# Patient Record
Sex: MALE | Race: WHITE | Employment: OTHER
[De-identification: names, ages, dates, MRNs, and addresses within clinical notes are randomized per-mention and may not be internally consistent; named-entity substitution may affect disease eponyms.]

---

## 2017-02-16 ENCOUNTER — OFFICE VISIT (OUTPATIENT)
Dept: FAMILY MEDICINE CLINIC | Facility: CLINIC | Age: 77
End: 2017-02-16

## 2017-02-16 VITALS
DIASTOLIC BLOOD PRESSURE: 70 MMHG | WEIGHT: 149 LBS | BODY MASS INDEX: 21.38 KG/M2 | OXYGEN SATURATION: 97 % | SYSTOLIC BLOOD PRESSURE: 133 MMHG | TEMPERATURE: 97.1 F | HEART RATE: 79 BPM

## 2017-02-16 DIAGNOSIS — E11.9 TYPE 2 DIABETES MELLITUS WITHOUT COMPLICATION, WITHOUT LONG-TERM CURRENT USE OF INSULIN (HCC): Primary | ICD-10-CM

## 2017-02-16 DIAGNOSIS — I10 ESSENTIAL HYPERTENSION: ICD-10-CM

## 2017-02-16 DIAGNOSIS — D64.9 ANEMIA, UNSPECIFIED TYPE: ICD-10-CM

## 2017-02-16 DIAGNOSIS — Z23 NEED FOR SHINGLES VACCINE: ICD-10-CM

## 2017-02-16 PROCEDURE — 1123F ACP DISCUSS/DSCN MKR DOCD: CPT | Performed by: FAMILY MEDICINE

## 2017-02-16 PROCEDURE — 99213 OFFICE O/P EST LOW 20 MIN: CPT | Performed by: FAMILY MEDICINE

## 2017-02-16 PROCEDURE — 1036F TOBACCO NON-USER: CPT | Performed by: FAMILY MEDICINE

## 2017-02-16 PROCEDURE — G8427 DOCREV CUR MEDS BY ELIG CLIN: HCPCS | Performed by: FAMILY MEDICINE

## 2017-02-16 PROCEDURE — G8484 FLU IMMUNIZE NO ADMIN: HCPCS | Performed by: FAMILY MEDICINE

## 2017-02-16 PROCEDURE — 4040F PNEUMOC VAC/ADMIN/RCVD: CPT | Performed by: FAMILY MEDICINE

## 2017-02-16 PROCEDURE — G8419 CALC BMI OUT NRM PARAM NOF/U: HCPCS | Performed by: FAMILY MEDICINE

## 2017-02-16 RX ORDER — SIMVASTATIN 40 MG
40 TABLET ORAL NIGHTLY
Qty: 30 TABLET | Refills: 3 | Status: SHIPPED | OUTPATIENT
Start: 2017-02-16 | End: 2017-04-14 | Stop reason: SDUPTHER

## 2017-02-19 ASSESSMENT — ENCOUNTER SYMPTOMS
VOMITING: 0
CONSTIPATION: 0
RHINORRHEA: 0
COUGH: 0
SHORTNESS OF BREATH: 0
ABDOMINAL PAIN: 0
WHEEZING: 0
NAUSEA: 0
BLOOD IN STOOL: 0
SORE THROAT: 0
TROUBLE SWALLOWING: 0
DIARRHEA: 0

## 2017-04-14 ENCOUNTER — OFFICE VISIT (OUTPATIENT)
Dept: FAMILY MEDICINE CLINIC | Age: 77
End: 2017-04-14
Payer: MEDICARE

## 2017-04-14 VITALS
SYSTOLIC BLOOD PRESSURE: 111 MMHG | BODY MASS INDEX: 20.62 KG/M2 | HEIGHT: 70 IN | HEART RATE: 82 BPM | DIASTOLIC BLOOD PRESSURE: 54 MMHG | WEIGHT: 144 LBS | TEMPERATURE: 97.5 F

## 2017-04-14 DIAGNOSIS — M79.645 PAIN OF FINGER OF LEFT HAND: Primary | ICD-10-CM

## 2017-04-14 DIAGNOSIS — E78.00 HYPERCHOLESTEREMIA: ICD-10-CM

## 2017-04-14 DIAGNOSIS — R19.7 DIARRHEA, UNSPECIFIED TYPE: ICD-10-CM

## 2017-04-14 DIAGNOSIS — Z23 NEED FOR PNEUMOCOCCAL VACCINATION: ICD-10-CM

## 2017-04-14 DIAGNOSIS — I10 ESSENTIAL HYPERTENSION: ICD-10-CM

## 2017-04-14 DIAGNOSIS — E11.9 TYPE 2 DIABETES MELLITUS WITHOUT COMPLICATION, WITHOUT LONG-TERM CURRENT USE OF INSULIN (HCC): ICD-10-CM

## 2017-04-14 PROCEDURE — 1036F TOBACCO NON-USER: CPT | Performed by: FAMILY MEDICINE

## 2017-04-14 PROCEDURE — 1123F ACP DISCUSS/DSCN MKR DOCD: CPT | Performed by: FAMILY MEDICINE

## 2017-04-14 PROCEDURE — 4040F PNEUMOC VAC/ADMIN/RCVD: CPT | Performed by: FAMILY MEDICINE

## 2017-04-14 PROCEDURE — G0009 ADMIN PNEUMOCOCCAL VACCINE: HCPCS | Performed by: FAMILY MEDICINE

## 2017-04-14 PROCEDURE — 90732 PPSV23 VACC 2 YRS+ SUBQ/IM: CPT | Performed by: FAMILY MEDICINE

## 2017-04-14 PROCEDURE — 99214 OFFICE O/P EST MOD 30 MIN: CPT | Performed by: FAMILY MEDICINE

## 2017-04-14 PROCEDURE — G8419 CALC BMI OUT NRM PARAM NOF/U: HCPCS | Performed by: FAMILY MEDICINE

## 2017-04-14 PROCEDURE — G8427 DOCREV CUR MEDS BY ELIG CLIN: HCPCS | Performed by: FAMILY MEDICINE

## 2017-04-14 RX ORDER — GLIMEPIRIDE 2 MG/1
2 TABLET ORAL 2 TIMES DAILY
Qty: 30 TABLET | Refills: 3 | Status: ON HOLD | OUTPATIENT
Start: 2017-04-14 | End: 2020-11-30

## 2017-04-14 RX ORDER — SIMVASTATIN 40 MG
40 TABLET ORAL NIGHTLY
Qty: 30 TABLET | Refills: 3 | Status: SHIPPED | OUTPATIENT
Start: 2017-04-14

## 2017-04-14 RX ORDER — LISINOPRIL AND HYDROCHLOROTHIAZIDE 20; 12.5 MG/1; MG/1
1 TABLET ORAL DAILY
Qty: 30 TABLET | Refills: 3 | Status: ON HOLD | OUTPATIENT
Start: 2017-04-14 | End: 2020-11-10 | Stop reason: HOSPADM

## 2017-04-14 ASSESSMENT — ENCOUNTER SYMPTOMS
BLOOD IN STOOL: 0
WHEEZING: 0
SHORTNESS OF BREATH: 0
VOMITING: 0
CONSTIPATION: 0
ABDOMINAL PAIN: 0
DIARRHEA: 1
SORE THROAT: 0
COUGH: 0
TROUBLE SWALLOWING: 0

## 2017-04-18 ENCOUNTER — HOSPITAL ENCOUNTER (OUTPATIENT)
Dept: GENERAL RADIOLOGY | Facility: CLINIC | Age: 77
Discharge: HOME | End: 2017-04-18
Payer: MEDICARE

## 2017-04-18 ENCOUNTER — HOSPITAL ENCOUNTER (OUTPATIENT)
Facility: CLINIC | Age: 77
Discharge: HOME | End: 2017-04-18
Payer: MEDICARE

## 2017-04-18 DIAGNOSIS — M79.645 PAIN OF FINGER OF LEFT HAND: ICD-10-CM

## 2017-04-18 PROCEDURE — 73130 X-RAY EXAM OF HAND: CPT

## 2020-11-09 ENCOUNTER — HOSPITAL ENCOUNTER (INPATIENT)
Age: 80
LOS: 1 days | Discharge: HOME | DRG: 683 | End: 2020-11-10
Attending: EMERGENCY MEDICINE | Admitting: INTERNAL MEDICINE
Payer: MEDICARE

## 2020-11-09 ENCOUNTER — APPOINTMENT (OUTPATIENT)
Dept: CT IMAGING | Age: 80
DRG: 683 | End: 2020-11-09
Payer: MEDICARE

## 2020-11-09 DIAGNOSIS — N17.9 AKI (ACUTE KIDNEY INJURY) (HCC): ICD-10-CM

## 2020-11-09 DIAGNOSIS — R10.33 PERIUMBILICAL ABDOMINAL PAIN: Primary | ICD-10-CM

## 2020-11-09 PROBLEM — Z95.5 S/P CORONARY ARTERY STENT PLACEMENT: Status: ACTIVE | Noted: 2020-11-09

## 2020-11-09 PROBLEM — Q60.0 SOLITARY KIDNEY, CONGENITAL: Status: ACTIVE | Noted: 2020-11-09

## 2020-11-09 LAB
ABSOLUTE EOS #: <0.03 K/UL (ref 0–0.44)
ABSOLUTE IMMATURE GRANULOCYTE: 0.05 K/UL (ref 0–0.3)
ABSOLUTE LYMPH #: 1.68 K/UL (ref 1.1–3.7)
ABSOLUTE MONO #: 0.67 K/UL (ref 0.1–1.2)
ALBUMIN SERPL-MCNC: 4.5 G/DL (ref 3.5–5.2)
ALBUMIN/GLOBULIN RATIO: 1.8 (ref 1–2.5)
ALP BLD-CCNC: 94 U/L (ref 40–129)
ALT SERPL-CCNC: 13 U/L (ref 5–41)
ANION GAP SERPL CALCULATED.3IONS-SCNC: 15 MMOL/L (ref 9–17)
AST SERPL-CCNC: 19 U/L
BASOPHILS # BLD: 1 % (ref 0–2)
BASOPHILS ABSOLUTE: 0.1 K/UL (ref 0–0.2)
BILIRUB SERPL-MCNC: 0.87 MG/DL (ref 0.3–1.2)
BILIRUBIN DIRECT: 0.26 MG/DL
BILIRUBIN, INDIRECT: 0.61 MG/DL (ref 0–1)
BUN BLDV-MCNC: 49 MG/DL (ref 8–23)
BUN/CREAT BLD: ABNORMAL (ref 9–20)
CALCIUM SERPL-MCNC: 9.9 MG/DL (ref 8.6–10.4)
CHLORIDE BLD-SCNC: 103 MMOL/L (ref 98–107)
CO2: 19 MMOL/L (ref 20–31)
CREAT SERPL-MCNC: 1.85 MG/DL (ref 0.7–1.2)
DIFFERENTIAL TYPE: ABNORMAL
EOSINOPHILS RELATIVE PERCENT: 0 % (ref 1–4)
GFR AFRICAN AMERICAN: 43 ML/MIN
GFR NON-AFRICAN AMERICAN: 35 ML/MIN
GFR SERPL CREATININE-BSD FRML MDRD: ABNORMAL ML/MIN/{1.73_M2}
GFR SERPL CREATININE-BSD FRML MDRD: ABNORMAL ML/MIN/{1.73_M2}
GLOBULIN: NORMAL G/DL (ref 1.5–3.8)
GLUCOSE BLD-MCNC: 148 MG/DL (ref 75–110)
GLUCOSE BLD-MCNC: 178 MG/DL (ref 70–99)
HCT VFR BLD CALC: 34.6 % (ref 40.7–50.3)
HEMOGLOBIN: 11.1 G/DL (ref 13–17)
IMMATURE GRANULOCYTES: 0 %
INR BLD: 1.1
LIPASE: 56 U/L (ref 13–60)
LYMPHOCYTES # BLD: 15 % (ref 24–43)
MCH RBC QN AUTO: 29.5 PG (ref 25.2–33.5)
MCHC RBC AUTO-ENTMCNC: 32.1 G/DL (ref 28.4–34.8)
MCV RBC AUTO: 92 FL (ref 82.6–102.9)
MONOCYTES # BLD: 6 % (ref 3–12)
NRBC AUTOMATED: 0 PER 100 WBC
PDW BLD-RTO: 14.6 % (ref 11.8–14.4)
PLATELET # BLD: 255 K/UL (ref 138–453)
PLATELET ESTIMATE: ABNORMAL
PMV BLD AUTO: 10.1 FL (ref 8.1–13.5)
POTASSIUM SERPL-SCNC: 4.5 MMOL/L (ref 3.7–5.3)
PROTHROMBIN TIME: 11.1 SEC (ref 9–12)
RBC # BLD: 3.76 M/UL (ref 4.21–5.77)
RBC # BLD: ABNORMAL 10*6/UL
SEG NEUTROPHILS: 78 % (ref 36–65)
SEGMENTED NEUTROPHILS ABSOLUTE COUNT: 8.84 K/UL (ref 1.5–8.1)
SODIUM BLD-SCNC: 137 MMOL/L (ref 135–144)
TOTAL PROTEIN: 7 G/DL (ref 6.4–8.3)
TROPONIN INTERP: ABNORMAL
TROPONIN INTERP: ABNORMAL
TROPONIN T: ABNORMAL NG/ML
TROPONIN T: ABNORMAL NG/ML
TROPONIN, HIGH SENSITIVITY: 33 NG/L (ref 0–22)
TROPONIN, HIGH SENSITIVITY: 34 NG/L (ref 0–22)
WBC # BLD: 11.4 K/UL (ref 3.5–11.3)
WBC # BLD: ABNORMAL 10*3/UL

## 2020-11-09 PROCEDURE — 83690 ASSAY OF LIPASE: CPT

## 2020-11-09 PROCEDURE — 80048 BASIC METABOLIC PNL TOTAL CA: CPT

## 2020-11-09 PROCEDURE — 93005 ELECTROCARDIOGRAM TRACING: CPT | Performed by: STUDENT IN AN ORGANIZED HEALTH CARE EDUCATION/TRAINING PROGRAM

## 2020-11-09 PROCEDURE — 82947 ASSAY GLUCOSE BLOOD QUANT: CPT

## 2020-11-09 PROCEDURE — 96360 HYDRATION IV INFUSION INIT: CPT

## 2020-11-09 PROCEDURE — 83930 ASSAY OF BLOOD OSMOLALITY: CPT

## 2020-11-09 PROCEDURE — 99285 EMERGENCY DEPT VISIT HI MDM: CPT

## 2020-11-09 PROCEDURE — 6370000000 HC RX 637 (ALT 250 FOR IP): Performed by: STUDENT IN AN ORGANIZED HEALTH CARE EDUCATION/TRAINING PROGRAM

## 2020-11-09 PROCEDURE — 80076 HEPATIC FUNCTION PANEL: CPT

## 2020-11-09 PROCEDURE — 74176 CT ABD & PELVIS W/O CONTRAST: CPT

## 2020-11-09 PROCEDURE — 96361 HYDRATE IV INFUSION ADD-ON: CPT

## 2020-11-09 PROCEDURE — 99223 1ST HOSP IP/OBS HIGH 75: CPT | Performed by: INTERNAL MEDICINE

## 2020-11-09 PROCEDURE — 84484 ASSAY OF TROPONIN QUANT: CPT

## 2020-11-09 PROCEDURE — 1200000000 HC SEMI PRIVATE

## 2020-11-09 PROCEDURE — 2580000003 HC RX 258: Performed by: STUDENT IN AN ORGANIZED HEALTH CARE EDUCATION/TRAINING PROGRAM

## 2020-11-09 PROCEDURE — 51798 US URINE CAPACITY MEASURE: CPT

## 2020-11-09 PROCEDURE — 85025 COMPLETE CBC W/AUTO DIFF WBC: CPT

## 2020-11-09 PROCEDURE — 36415 COLL VENOUS BLD VENIPUNCTURE: CPT

## 2020-11-09 PROCEDURE — 85610 PROTHROMBIN TIME: CPT

## 2020-11-09 RX ORDER — SODIUM CHLORIDE 0.9 % (FLUSH) 0.9 %
10 SYRINGE (ML) INJECTION EVERY 12 HOURS SCHEDULED
Status: DISCONTINUED | OUTPATIENT
Start: 2020-11-09 | End: 2020-11-10 | Stop reason: HOSPADM

## 2020-11-09 RX ORDER — PROMETHAZINE HYDROCHLORIDE 12.5 MG/1
12.5 TABLET ORAL EVERY 6 HOURS PRN
Status: DISCONTINUED | OUTPATIENT
Start: 2020-11-09 | End: 2020-11-10 | Stop reason: HOSPADM

## 2020-11-09 RX ORDER — SODIUM CHLORIDE 0.9 % (FLUSH) 0.9 %
10 SYRINGE (ML) INJECTION PRN
Status: DISCONTINUED | OUTPATIENT
Start: 2020-11-09 | End: 2020-11-10 | Stop reason: HOSPADM

## 2020-11-09 RX ORDER — ACETAMINOPHEN 325 MG/1
650 TABLET ORAL EVERY 6 HOURS PRN
Status: DISCONTINUED | OUTPATIENT
Start: 2020-11-09 | End: 2020-11-10 | Stop reason: HOSPADM

## 2020-11-09 RX ORDER — NICOTINE POLACRILEX 4 MG
15 LOZENGE BUCCAL PRN
Status: DISCONTINUED | OUTPATIENT
Start: 2020-11-09 | End: 2020-11-10 | Stop reason: HOSPADM

## 2020-11-09 RX ORDER — ACETAMINOPHEN 650 MG/1
650 SUPPOSITORY RECTAL EVERY 6 HOURS PRN
Status: DISCONTINUED | OUTPATIENT
Start: 2020-11-09 | End: 2020-11-10 | Stop reason: HOSPADM

## 2020-11-09 RX ORDER — SODIUM CHLORIDE 9 MG/ML
INJECTION, SOLUTION INTRAVENOUS CONTINUOUS
Status: DISCONTINUED | OUTPATIENT
Start: 2020-11-09 | End: 2020-11-10 | Stop reason: HOSPADM

## 2020-11-09 RX ORDER — ONDANSETRON 2 MG/ML
4 INJECTION INTRAMUSCULAR; INTRAVENOUS EVERY 6 HOURS PRN
Status: DISCONTINUED | OUTPATIENT
Start: 2020-11-09 | End: 2020-11-10 | Stop reason: HOSPADM

## 2020-11-09 RX ORDER — HEPARIN SODIUM 5000 [USP'U]/ML
5000 INJECTION, SOLUTION INTRAVENOUS; SUBCUTANEOUS EVERY 8 HOURS SCHEDULED
Status: DISCONTINUED | OUTPATIENT
Start: 2020-11-09 | End: 2020-11-10

## 2020-11-09 RX ORDER — DEXTROSE MONOHYDRATE 50 MG/ML
100 INJECTION, SOLUTION INTRAVENOUS PRN
Status: DISCONTINUED | OUTPATIENT
Start: 2020-11-09 | End: 2020-11-10 | Stop reason: HOSPADM

## 2020-11-09 RX ORDER — PANTOPRAZOLE SODIUM 40 MG/1
40 TABLET, DELAYED RELEASE ORAL
Status: DISCONTINUED | OUTPATIENT
Start: 2020-11-10 | End: 2020-11-10 | Stop reason: HOSPADM

## 2020-11-09 RX ORDER — DEXTROSE MONOHYDRATE 25 G/50ML
12.5 INJECTION, SOLUTION INTRAVENOUS PRN
Status: DISCONTINUED | OUTPATIENT
Start: 2020-11-09 | End: 2020-11-10 | Stop reason: HOSPADM

## 2020-11-09 RX ORDER — ATORVASTATIN CALCIUM 40 MG/1
40 TABLET, FILM COATED ORAL DAILY
Status: DISCONTINUED | OUTPATIENT
Start: 2020-11-10 | End: 2020-11-10 | Stop reason: HOSPADM

## 2020-11-09 RX ORDER — WARFARIN SODIUM 2 MG/1
2 TABLET ORAL
Status: ON HOLD | COMMUNITY
End: 2020-11-10 | Stop reason: HOSPADM

## 2020-11-09 RX ORDER — CARVEDILOL 12.5 MG/1
6.25 TABLET ORAL 2 TIMES DAILY WITH MEALS
Status: DISCONTINUED | OUTPATIENT
Start: 2020-11-09 | End: 2020-11-10 | Stop reason: HOSPADM

## 2020-11-09 RX ORDER — 0.9 % SODIUM CHLORIDE 0.9 %
1000 INTRAVENOUS SOLUTION INTRAVENOUS ONCE
Status: COMPLETED | OUTPATIENT
Start: 2020-11-09 | End: 2020-11-09

## 2020-11-09 RX ADMIN — INSULIN LISPRO 1 UNITS: 100 INJECTION, SOLUTION INTRAVENOUS; SUBCUTANEOUS at 23:35

## 2020-11-09 RX ADMIN — CARVEDILOL 6.25 MG: 12.5 TABLET, FILM COATED ORAL at 21:06

## 2020-11-09 RX ADMIN — SODIUM CHLORIDE: 9 INJECTION, SOLUTION INTRAVENOUS at 23:24

## 2020-11-09 RX ADMIN — Medication 10 ML: at 23:21

## 2020-11-09 RX ADMIN — SODIUM CHLORIDE 1000 ML: 9 INJECTION, SOLUTION INTRAVENOUS at 16:53

## 2020-11-09 ASSESSMENT — ENCOUNTER SYMPTOMS
DIARRHEA: 0
CONSTIPATION: 0
VOMITING: 0
BACK PAIN: 0
SHORTNESS OF BREATH: 0
ABDOMINAL PAIN: 1
COUGH: 0
NAUSEA: 0

## 2020-11-09 ASSESSMENT — PAIN DESCRIPTION - PAIN TYPE
TYPE: ACUTE PAIN

## 2020-11-09 ASSESSMENT — PAIN DESCRIPTION - LOCATION
LOCATION: ABDOMEN
LOCATION: ABDOMEN

## 2020-11-09 ASSESSMENT — PAIN DESCRIPTION - ONSET: ONSET: PROGRESSIVE

## 2020-11-09 ASSESSMENT — PAIN SCALES - GENERAL: PAINLEVEL_OUTOF10: 8

## 2020-11-09 NOTE — ED NOTES
Bedside rectal exam performed per Dr. Judd Newman. Writer and Suzanne Hinson RN at bedside as chaperone. Pt with positive hemoccult.       Teresita Lombardo RN  11/09/20 [id number]

## 2020-11-09 NOTE — ED PROVIDER NOTES
[facility]     Emergency Department     Faculty Attestation    I performed a history and physical examination of the patient and discussed management with the resident. I reviewed the residents note and agree with the documented findings and plan of care. Any areas of disagreement are noted on the chart. I was personally present for the key portions of any procedures. I have documented in the chart those procedures where I was not present during the key portions. I have reviewed the emergency nurses triage note. I agree with the chief complaint, past medical history, past surgical history, allergies, medications, social, and family history as documented unless otherwise noted below. 77yo male with mid abdominal discomfort for several days  No nausea or vomiting  Recent constipation and black stools  On coumadin  No  complaints  No fever  No chest or pulmonary complaints  Mild epigastric and mid abdominal pain on exam  No rebound tenderness or guarding  Normal BS on my exam    EKG at 1515 shows a sinus rhythm with a rate of 71 bpm.  Underlying bundle branch branch block present. Normal axis. Poorly progression. T wave inversions in lead III. No ST elevation or evidence for acute infarction.     Labs reviewed    CT imaging reviewed  Will plan on admission        Mignon Tamez DO  Attending Emergency Physician       Monae Loera DO  11/09/20 [id number]

## 2020-11-09 NOTE — ED PROVIDER NOTES
[facility]  ED  Emergency Department Encounter  Emergency Medicine Resident     Pt Name: Deirdre Sam  MRN: [id number]  [place] 1940  Date of evaluation: 11/9/20  PCP:  Yaya Dupree MD    [address] [place] [address]       Chief Complaint   Patient presents with    Abdominal Pain     pt with c/o mid upper abdominal pain x several days. worsening today. worse with ambulation. pt with hx of gastric ulcers. pt also with decreased bowel movements, last bm was yesterday, small amount, black stools. pt denies nausea and vomiting. pt does take coumadin. pts last colonoscopy was two years ago. hx of hernia repair and gallstones. HISTORY OFPRESENT ILLNESS  (Location/Symptom, Timing/Onset, Context/Setting, Quality, Duration, Modifying Factors,Severity.)      Deirdre Sam is a 78 y.o. male who presents with complaints of abdominal pain. Patient with past medical history of cholecystectomy, hernia repair, unremarkable colonoscopy approximately 2 years ago per patient, diabetes, diverticulosis. Patient is on Coumadin history of mural thrombus per chart and from medical cardiology. Patient states that starting approximately 3 days ago he began having upper abdominal pain. Patient states that this feels similar to an episode he had in the past with peptic ulcer. He states that he normally has a bowel movement 2-3 times a day, this is decreased to 1 time and has been hard. He denies any blood in his stool but does state he has been having black stools. He denies any lightheadedness, dizziness. He is unsure of what his most recent INR was. Patient denies any nausea, vomiting, continues to pass gas. He denies any chest pain, shortness of breath, fevers, cough, sick contacts.     PAST MEDICAL / SURGICAL / SOCIAL / FAMILY HISTORY      has a past medical history of Diverticulosis of colon, DM (diabetes mellitus) ([facility] [address] History of colon polyps, Hyperlipidemia, Hypertension, Trigger finger, and Tubular adenoma of colon. has a past surgical history that includes Cholecystectomy; Colonoscopy; hernia repair (Left); and Colonoscopy (10/05/2016). Social History     Socioeconomic History    Marital status: Single     Spouse name: Not on file    Number of children: Not on file    Years of education: Not on file    Highest education level: Not on file   Occupational History    Not on file   Social Needs    Financial resource strain: Not on file    Food insecurity     Worry: Not on file     Inability: Not on file    Transportation needs     Medical: Not on file     Non-medical: Not on file   Tobacco Use    Smoking status: Former Smoker     Last attempt to quit: 3/15/1999     Years since quittin.6    Smokeless tobacco: Never Used   Substance and Sexual Activity    Alcohol use: No     Alcohol/week: 0.0 standard drinks    Drug use: No    Sexual activity: Not on file   Lifestyle    Physical activity     Days per week: Not on file     Minutes per session: Not on file    Stress: Not on file   Relationships    Social connections     Talks on phone: Not on file     Gets together: Not on file     Attends Buddhist service: Not on file     Active member of club or organization: Not on file     Attends meetings of clubs or organizations: Not on file     Relationship status: Not on file    Intimate partner violence     Fear of current or ex partner: Not on file     Emotionally abused: Not on file     Physically abused: Not on file     Forced sexual activity: Not on file   Other Topics Concern    Not on file   Social History Narrative    Not on file       Family History   Problem Relation Age of Onset    Cancer Mother         unknown site    Heart Disease Father     Cancer Brother         unknown site        Allergies:  Patient has no known allergies. Home Medications:  Prior to Admission medications    Medication Sig Start Date End Date Taking?  Authorizing Provider   warfarin (COUMADIN) 2 97.2 °F (36.2 °C) Skin 98 18 100 % 5' 10\" (1.778 m) 170 lb (77.1 kg)       Physical Exam  Vitals signs and nursing note reviewed. Exam conducted with a chaperone present. Constitutional:       General: He is not in acute distress. Appearance: Normal appearance. He is well-developed. He is not diaphoretic. HENT:      Head: Normocephalic and atraumatic. Nose: Nose normal.   Eyes:      Conjunctiva/sclera: Conjunctivae normal.   Cardiovascular:      Rate and Rhythm: Normal rate and regular rhythm. Heart sounds: Normal heart sounds. Pulmonary:      Effort: Pulmonary effort is normal. No respiratory distress. Breath sounds: Normal breath sounds. No wheezing. Abdominal:      General: There is no distension. Palpations: Abdomen is soft. Tenderness: There is no abdominal tenderness. There is no right CVA tenderness, left CVA tenderness or guarding. Genitourinary:     Rectum: Guaiac result positive. No tenderness, external hemorrhoid or internal hemorrhoid. Normal anal tone. Musculoskeletal: Normal range of motion. General: No swelling or tenderness. Skin:     General: Skin is warm and dry. Neurological:      Mental Status: He is alert. Mental status is at baseline. Psychiatric:         Behavior: Behavior normal.         Thought Content:  Thought content normal.         DIFFERENTIAL  DIAGNOSIS     PLAN (LABS / IMAGING / EKG):  Orders Placed This Encounter   Procedures    CT ABDOMEN PELVIS WO CONTRAST Additional Contrast? None    LIPASE    Troponin    CBC WITH AUTO DIFFERENTIAL    BASIC METABOLIC PANEL    Protime-INR    Troponin    Hepatic Function Panel    Urinalysis Reflex to Culture    Telemetry Monitoring    Inpatient consult to Internal Medicine    EKG 12 Lead    PATIENT STATUS (FROM ED OR OR/PROCEDURAL) Inpatient       MEDICATIONS ORDERED:  Orders Placed This Encounter   Medications    0.9 % sodium chloride bolus       Initial MDM/Plan/ED COURSE:    80 y.o. male who presents with complaints of abdominal pain. On exam patient is well-appearing, nontoxic. Vital signs are within normal limits. On physical exam abdomen is soft, unable to reproduce tenderness. No guarding, rigidity, rebound tenderness. No CVA tenderness. Cardiac regular rate and rhythm. Lungs clear to auscultation bilaterally. No lower extremity swelling or calf tenderness. On rectal exam there is no internal or external hemorrhoids, normal rectal tone. Guaiac positive. No gross blood. We will plan for abdominal labs, including INR. We will plan for CT abdomen pelvis, EKG given patient's age, known history of ACS and upper abdominal pain. ED Course as of Nov 09 1721   Mon Nov 09, 2020   1531 Stable when compared to prior. Hemoglobin Quant(!): 11.1 [LW]   1553 Elevated from baseline. Creatinine(!): 1.85 [LW]   1553 No prior for comparison. We will plan for repeat, likely elevated in conjunction with CHA. Troponin, High Sensitivity(!): 33 [LW]   1606 CT scan changed to without contrast given creatinine 1.85.    [LW]   1627 CT scan negative. We will plan to admit to internal medicine for CHA, elevated troponin    [LW]   1712 Stable   Troponin, High Sensitivity(!): 34 [LW]      ED Course User Index  [LW] Karina Cai DO      Given CHA, solitary kidney, and elevated troponin we will plan to admit. Internal medicine agreeable for admission.     DIAGNOSTIC RESULTS / EMERGENCYDEPARTMENT COURSE / MDM     LABS:  Labs Reviewed   TROPONIN - Abnormal; Notable for the following components:       Result Value    Troponin, High Sensitivity 33 (*)     All other components within normal limits   CBC WITH AUTO DIFFERENTIAL - Abnormal; Notable for the following components:    WBC 11.4 (*)     RBC 3.76 (*)     Hemoglobin 11.1 (*)     Hematocrit 34.6 (*)     RDW 14.6 (*)     Seg Neutrophils 78 (*)     Lymphocytes 15 (*)     Eosinophils % 0 (*)     Segs Absolute 8.84 (*)     All other components within normal limits   BASIC METABOLIC PANEL - Abnormal; Notable for the following components:    Glucose 178 (*)     BUN 49 (*)     CREATININE 1.85 (*)     CO2 19 (*)     GFR Non- 35 (*)     GFR  43 (*)     All other components within normal limits   TROPONIN - Abnormal; Notable for the following components:    Troponin, High Sensitivity 34 (*)     All other components within normal limits   LIPASE   PROTIME-INR   HEPATIC FUNCTION PANEL   URINE RT REFLEX TO CULTURE           Ct Abdomen Pelvis Wo Contrast Additional Contrast? None    Result Date: 11/9/2020  EXAMINATION: CT OF THE ABDOMEN AND PELVIS WITHOUT CONTRAST 11/9/2020 3:59 pm TECHNIQUE: CT of the abdomen and pelvis was performed without the administration of intravenous contrast. Multiplanar reformatted images are provided for review. Dose modulation, iterative reconstruction, and/or weight based adjustment of the mA/kV was utilized to reduce the radiation dose to as low as reasonably achievable. COMPARISON: None. HISTORY: ORDERING SYSTEM PROVIDED HISTORY: abdominal pain, hx cholecy TECHNOLOGIST PROVIDED HISTORY: abdominal pain, hx cholecy Reason for Exam: abdominal pain, hx cholecy FINDINGS: Lower Chest: No acute abnormality within the visualized lung bases. 5 mm noncalcified right lower lobe nodule. Coronary artery atherosclerosis Organs: Within the limitations of a noncontrast examination, no acute abnormality within the liver, spleen, pancreas, or adrenal glands. Prior cholecystectomy. Prior left nephrectomy. No nephrolithiasis or hydronephrosis. 2.2 cm simple appearing right lower pole renal cyst. GI/Bowel: Stomach is partially distended. The small bowel is nondilated. The colon is normal in caliber. The appendix is normal in caliber. Pelvis: The bladder is partially distended without vesicular stone. Prostate is within normal limits for size. Peritoneum/Retroperitoneum: No ascites or pneumoperitoneum. Atherosclerosis of the nondilated abdominal aorta. Bones/Soft Tissues: No acute osseous abnormality. Degenerative changes in the lumbar spine. 1. No acute intra-abdominal abnormality. 2. 5 mm noncalcified right lower lobe nodule. RECOMMENDATIONS: Guidelines for follow-up and management of pulmonary nodules found on incomplete chest CT: <6 mm - No follow up recommend on the basis of the estimated low risk of malignancy. Reference:  Radiology. 2017 Jul; 284(1):228-243         PROCEDURES:  None    CONSULTS:  IP CONSULT TO INTERNAL MEDICINE    CRITICAL CARE:  Please see attending note    FINAL IMPRESSION      1. Periumbilical abdominal pain    2. CHA (acute kidney injury) ([facility] [address]          DISPOSITION / PLAN     DISPOSITION Admitted 11/09/2020 04:52:36 PM      PATIENT REFERRED TO:  No follow-up provider specified.     DISCHARGE MEDICATIONS:  New Prescriptions    No medications on file       Concepcion Sanford DO  Emergency Medicine Resident    (Please note that portions of this note were completed with a voice recognition program.Efforts were made to edit the dictations but occasionally words are mis-transcribed.)        Concepcion Sanford DO  Resident  11/09/20 [id number]

## 2020-11-10 ENCOUNTER — APPOINTMENT (OUTPATIENT)
Dept: ULTRASOUND IMAGING | Age: 80
DRG: 683 | End: 2020-11-10
Payer: MEDICARE

## 2020-11-10 VITALS
SYSTOLIC BLOOD PRESSURE: 130 MMHG | RESPIRATION RATE: 15 BRPM | DIASTOLIC BLOOD PRESSURE: 59 MMHG | BODY MASS INDEX: 24.34 KG/M2 | OXYGEN SATURATION: 100 % | TEMPERATURE: 98.1 F | HEIGHT: 70 IN | HEART RATE: 60 BPM | WEIGHT: 170 LBS

## 2020-11-10 PROBLEM — K21.9 GERD (GASTROESOPHAGEAL REFLUX DISEASE): Status: ACTIVE | Noted: 2020-11-10

## 2020-11-10 LAB
ABSOLUTE EOS #: 0.06 K/UL (ref 0–0.44)
ABSOLUTE IMMATURE GRANULOCYTE: <0.03 K/UL (ref 0–0.3)
ABSOLUTE LYMPH #: 1.51 K/UL (ref 1.1–3.7)
ABSOLUTE MONO #: 0.61 K/UL (ref 0.1–1.2)
ANION GAP SERPL CALCULATED.3IONS-SCNC: 15 MMOL/L (ref 9–17)
BASOPHILS # BLD: 1 % (ref 0–2)
BASOPHILS ABSOLUTE: 0.06 K/UL (ref 0–0.2)
BILIRUBIN URINE: NEGATIVE
BUN BLDV-MCNC: 39 MG/DL (ref 8–23)
BUN/CREAT BLD: ABNORMAL (ref 9–20)
CALCIUM SERPL-MCNC: 8.5 MG/DL (ref 8.6–10.4)
CHLORIDE BLD-SCNC: 109 MMOL/L (ref 98–107)
CO2: 17 MMOL/L (ref 20–31)
COLOR: YELLOW
COMMENT UA: ABNORMAL
CREAT SERPL-MCNC: 1.67 MG/DL (ref 0.7–1.2)
CREATININE URINE: 138.8 MG/DL (ref 39–259)
DIFFERENTIAL TYPE: ABNORMAL
DIRECT EXAM: NEGATIVE
EOSINOPHILS RELATIVE PERCENT: 1 % (ref 1–4)
GFR AFRICAN AMERICAN: 48 ML/MIN
GFR NON-AFRICAN AMERICAN: 40 ML/MIN
GFR SERPL CREATININE-BSD FRML MDRD: ABNORMAL ML/MIN/{1.73_M2}
GFR SERPL CREATININE-BSD FRML MDRD: ABNORMAL ML/MIN/{1.73_M2}
GLUCOSE BLD-MCNC: 103 MG/DL (ref 75–110)
GLUCOSE BLD-MCNC: 109 MG/DL (ref 70–99)
GLUCOSE BLD-MCNC: 130 MG/DL (ref 75–110)
GLUCOSE BLD-MCNC: 242 MG/DL (ref 75–110)
GLUCOSE URINE: NEGATIVE
HCT VFR BLD CALC: 29.4 % (ref 40.7–50.3)
HEMOGLOBIN: 9.1 G/DL (ref 13–17)
IMMATURE GRANULOCYTES: 0 %
KETONES, URINE: ABNORMAL
LEUKOCYTE ESTERASE, URINE: NEGATIVE
LYMPHOCYTES # BLD: 22 % (ref 24–43)
Lab: NORMAL
MCH RBC QN AUTO: 29.4 PG (ref 25.2–33.5)
MCHC RBC AUTO-ENTMCNC: 31 G/DL (ref 28.4–34.8)
MCV RBC AUTO: 94.8 FL (ref 82.6–102.9)
MONOCYTES # BLD: 9 % (ref 3–12)
NITRITE, URINE: NEGATIVE
NRBC AUTOMATED: 0 PER 100 WBC
OSMOLALITY URINE: 742 MOSM/KG (ref 80–1300)
PDW BLD-RTO: 14.6 % (ref 11.8–14.4)
PH UA: 5 (ref 5–8)
PLATELET # BLD: 172 K/UL (ref 138–453)
PLATELET ESTIMATE: ABNORMAL
PMV BLD AUTO: 10.3 FL (ref 8.1–13.5)
POTASSIUM SERPL-SCNC: 4.3 MMOL/L (ref 3.7–5.3)
PROTEIN UA: NEGATIVE
RBC # BLD: 3.1 M/UL (ref 4.21–5.77)
RBC # BLD: ABNORMAL 10*6/UL
SEG NEUTROPHILS: 68 % (ref 36–65)
SEGMENTED NEUTROPHILS ABSOLUTE COUNT: 4.75 K/UL (ref 1.5–8.1)
SERUM OSMOLALITY: 307 MOSM/KG (ref 275–295)
SODIUM BLD-SCNC: 141 MMOL/L (ref 135–144)
SODIUM,UR: 86 MMOL/L
SPECIFIC GRAVITY UA: 1.02 (ref 1–1.03)
SPECIMEN DESCRIPTION: NORMAL
TOTAL PROTEIN, URINE: 9 MG/DL
TROPONIN INTERP: ABNORMAL
TROPONIN INTERP: ABNORMAL
TROPONIN T: ABNORMAL NG/ML
TROPONIN T: ABNORMAL NG/ML
TROPONIN, HIGH SENSITIVITY: 32 NG/L (ref 0–22)
TROPONIN, HIGH SENSITIVITY: 35 NG/L (ref 0–22)
TURBIDITY: CLEAR
URINE HGB: NEGATIVE
UROBILINOGEN, URINE: NORMAL
WBC # BLD: 7 K/UL (ref 3.5–11.3)
WBC # BLD: ABNORMAL 10*3/UL

## 2020-11-10 PROCEDURE — 96372 THER/PROPH/DIAG INJ SC/IM: CPT

## 2020-11-10 PROCEDURE — 96361 HYDRATE IV INFUSION ADD-ON: CPT

## 2020-11-10 PROCEDURE — 97165 OT EVAL LOW COMPLEX 30 MIN: CPT

## 2020-11-10 PROCEDURE — 6360000002 HC RX W HCPCS: Performed by: STUDENT IN AN ORGANIZED HEALTH CARE EDUCATION/TRAINING PROGRAM

## 2020-11-10 PROCEDURE — 2580000003 HC RX 258: Performed by: STUDENT IN AN ORGANIZED HEALTH CARE EDUCATION/TRAINING PROGRAM

## 2020-11-10 PROCEDURE — 36415 COLL VENOUS BLD VENIPUNCTURE: CPT

## 2020-11-10 PROCEDURE — 99222 1ST HOSP IP/OBS MODERATE 55: CPT | Performed by: INTERNAL MEDICINE

## 2020-11-10 PROCEDURE — 81003 URINALYSIS AUTO W/O SCOPE: CPT

## 2020-11-10 PROCEDURE — 97535 SELF CARE MNGMENT TRAINING: CPT

## 2020-11-10 PROCEDURE — 83935 ASSAY OF URINE OSMOLALITY: CPT

## 2020-11-10 PROCEDURE — 6370000000 HC RX 637 (ALT 250 FOR IP): Performed by: STUDENT IN AN ORGANIZED HEALTH CARE EDUCATION/TRAINING PROGRAM

## 2020-11-10 PROCEDURE — 76770 US EXAM ABDO BACK WALL COMP: CPT

## 2020-11-10 PROCEDURE — 84484 ASSAY OF TROPONIN QUANT: CPT

## 2020-11-10 PROCEDURE — 97161 PT EVAL LOW COMPLEX 20 MIN: CPT

## 2020-11-10 PROCEDURE — 84156 ASSAY OF PROTEIN URINE: CPT

## 2020-11-10 PROCEDURE — 80048 BASIC METABOLIC PNL TOTAL CA: CPT

## 2020-11-10 PROCEDURE — 82947 ASSAY GLUCOSE BLOOD QUANT: CPT

## 2020-11-10 PROCEDURE — 84300 ASSAY OF URINE SODIUM: CPT

## 2020-11-10 PROCEDURE — 85025 COMPLETE CBC W/AUTO DIFF WBC: CPT

## 2020-11-10 PROCEDURE — 87338 HPYLORI STOOL AG IA: CPT

## 2020-11-10 PROCEDURE — 99232 SBSQ HOSP IP/OBS MODERATE 35: CPT | Performed by: INTERNAL MEDICINE

## 2020-11-10 PROCEDURE — 82570 ASSAY OF URINE CREATININE: CPT

## 2020-11-10 RX ORDER — CARVEDILOL 6.25 MG/1
6.25 TABLET ORAL 2 TIMES DAILY WITH MEALS
Qty: 60 TABLET | Refills: 3 | Status: SHIPPED | OUTPATIENT
Start: 2020-11-10

## 2020-11-10 RX ADMIN — HEPARIN SODIUM 5000 UNITS: 5000 INJECTION INTRAVENOUS; SUBCUTANEOUS at 00:35

## 2020-11-10 RX ADMIN — PANTOPRAZOLE SODIUM 40 MG: 40 TABLET, DELAYED RELEASE ORAL at 09:12

## 2020-11-10 RX ADMIN — INSULIN LISPRO 2 UNITS: 100 INJECTION, SOLUTION INTRAVENOUS; SUBCUTANEOUS at 12:01

## 2020-11-10 RX ADMIN — SODIUM CHLORIDE: 9 INJECTION, SOLUTION INTRAVENOUS at 07:06

## 2020-11-10 RX ADMIN — DESMOPRESSIN ACETATE 40 MG: 0.2 TABLET ORAL at 09:12

## 2020-11-10 RX ADMIN — HEPARIN SODIUM 5000 UNITS: 5000 INJECTION INTRAVENOUS; SUBCUTANEOUS at 14:56

## 2020-11-10 RX ADMIN — HEPARIN SODIUM 5000 UNITS: 5000 INJECTION INTRAVENOUS; SUBCUTANEOUS at 09:12

## 2020-11-10 RX ADMIN — CARVEDILOL 6.25 MG: 12.5 TABLET, FILM COATED ORAL at 09:12

## 2020-11-10 ASSESSMENT — ENCOUNTER SYMPTOMS
SHORTNESS OF BREATH: 0
DIARRHEA: 0
BLOOD IN STOOL: 0
COUGH: 1
BACK PAIN: 0
ABDOMINAL PAIN: 1
VOMITING: 0
CONSTIPATION: 1
WHEEZING: 0
NAUSEA: 0

## 2020-11-10 NOTE — CARE COORDINATION
Case Management Initial Discharge Plan  [address] ,             Met with:patient to discuss discharge plans. Information verified: address, contacts, phone number, , insurance Yes    Emergency Contact/Next of [address] name & number: Davis Ramirez [phone]    PCP: Zoran Adams MD  Date of last visit: 6     Insurance Provider: [facility]    Discharge Planning    Living Arrangements:  Family Members   Support Systems:  Family Members    Home has 1 stories  1 stairs to climb to get into front door, na stairs to climb to reach second floor  Location of bedroom/bathroom in home main floor     Patient able to perform ADL's:Independent    Current Services (outpatient & in home) none   DME equipment: none   DME provider: na    Receiving oral anticoagulation therapy? No    If indicated:   Physician managing anticoagulation treatment: na  Where does patient obtain lab work for ATC treatment? na      Potential Assistance Needed:  N/A    Patient agreeable to home care: No  [place] of choice provided:  n/a    Prior SNF/Rehab Placement and Facility: none   Agreeable to SNF/Rehab: No  [place] of choice provided: n/a     Evaluation: no    Expected Discharge date:  11/10/20    Patient expects to be discharged to:  HOME  Follow Up Appointment: Best Day/ Time: Monday AM    Transportation provider: family   Transportation arrangements needed for discharge: No    Readmission Risk              Risk of Unplanned Readmission:        16             Does patient have a readmission risk score greater than 14?: No  If yes, follow-up appointment must be made within 7 days of discharge. Goals of Care:  To get healthy       Discharge Plan: Home independently           Electronically signed by Fallon Mcgregor RN on 11/10/20 at 1:53 PM EST

## 2020-11-10 NOTE — PROGRESS NOTES
Occupational Therapy   Occupational Therapy Initial Assessment  Date: 11/10/2020   Patient Name: Renetta Seo  MRN: [id number]     : 1940    Date of Service: 11/10/2020    Discharge Recommendations:    No therapy recommended at discharge. Assessment   Treatment Diagnosis: abdominal pain  Prognosis: Good  Decision Making: Low Complexity  Patient Education: pt ed on POC, purpose of eval, balancing rest with movement, safety during functional mobility. good return  No Skilled OT: Independent with functional mobility; Independent with ADL's;No OT goals identified  REQUIRES OT FOLLOW UP: No  Activity Tolerance  Activity Tolerance: Patient Tolerated treatment well  Safety Devices  Safety Devices in place: Yes  Type of devices: Call light within reach;Gait belt;Left in bed  Restraints  Initially in place: No         Patient Diagnosis(es): The primary encounter diagnosis was Periumbilical abdominal pain. A diagnosis of CHA (acute kidney injury) ([facility] [address] was also pertinent to this visit. has a past medical history of Diverticulosis of colon, DM (diabetes mellitus) ([facility] [address] History of colon polyps, Hyperlipidemia, Hypertension, Trigger finger, and Tubular adenoma of colon. has a past surgical history that includes Cholecystectomy; Colonoscopy; hernia repair (Left); and Colonoscopy (10/05/2016). Treatment Diagnosis: abdominal pain      Restrictions  Restrictions/Precautions  Restrictions/Precautions: General Precautions, Up as Tolerated  Required Braces or Orthoses?: No  Position Activity Restriction  Other position/activity restrictions: up with assist    Subjective   General  Patient assessed for rehabilitation services?: Yes  Family / Caregiver Present: No  Diagnosis: abdominal pain  General Comment  Comments: RN ok'd for therapy this morning.  pt agreeable to participate in session and  cooperative/pleasant throughout    Social/Functional History  Social/Functional History  Lives With: (nephew and nephew wife)  Type of Home: House  Home Layout: One level  Home Access: Stairs to enter without rails  Entrance Stairs - Number of Steps: 1  Bathroom Shower/Tub: Tub/Shower unit  Bathroom Toilet: Standard  Home Equipment: (pt reported no use of DME at baseline)  ADL Assistance: [address]: Independent  Homemaking Responsibilities: Yes(pt reported  splitting with nephew)  Meal Prep Responsibility: Secondary  Laundry Responsibility: Secondary  Cleaning Responsibility: Secondary  Ambulation Assistance: Independent  Transfer Assistance: Independent  Active : No  Patient's  Info: nephew drives  Mode of Transportation: Car  Occupation: Retired  Type of occupation: marine  Leisure & Hobbies: [employer]  Additional Comments: pt reported someone is home with pt 24/7 and able to assist PRN       Objective   Vision: Impaired  Vision Exceptions: Wears glasses at all times  Hearing: Exceptions to [facility]  Hearing Exceptions: Bilateral hearing aid    Orientation  Overall Orientation Status: Within Functional Limits  Observation/Palpation  Posture: Good  Balance  Sitting Balance: Independent(~6 minutes on eOB)  Standing Balance: Supervision  Standing Balance  Time: ~4 minutes  Activity: pt completed functional mobility in hallway with PT and within hospital room  Comment: pt with no LOB and reported being at baseline  ADL  Feeding: Independent  Grooming: Independent  UE Bathing: Independent  LE Bathing: Modified independent   UE Dressing: Independent  LE Dressing: Modified independent   Toileting: Modified independent   Additional Comments: OT facilitated pt in donning B socks while sitting on EOB.  Pt required increased time to complete but did complete independently  Tone RUE  RUE Tone: Normotonic  Tone LUE  LUE Tone: Normotonic  Coordination  Movements Are Fluid And Coordinated: Yes     Bed mobility  Supine to Sit: Independent  Sit to Supine: (pt retired to [employer])  Scooting: Independent  Transfers  Sit to stand: Supervision  Stand to sit: Supervision     Cognition  Overall Cognitive Status: WFL        Sensation  Overall Sensation Status: WFL        LUE AROM (degrees)  LUE AROM : WFL  Left Hand AROM (degrees)  Left Hand AROM: WFL  RUE AROM (degrees)  RUE AROM : WFL  Right Hand AROM (degrees)  Right Hand AROM: WFL  LUE Strength  Gross LUE Strength: WFL  L Hand General: 5/5  RUE Strength  Gross RUE Strength: WFL  R Hand General: 5/5      Plan   Plan  Times per week: D/C OT    AM-PAC Score        AM-[facility] Inpatient Daily Activity Raw Score: 24 (11/10/20 1420)  AM-PAC Inpatient ADL T-Scale Score : 57.54 (11/10/20 1420)  ADL Inpatient CMS 0-100% Score: 0 (11/10/20 1420)  ADL Inpatient CMS G-Code Modifier : CH (11/10/20 1420)    Therapy Time   Individual Concurrent Group Co-treatment   Time In 0933         Time Out 0945         Minutes 12         Timed Code Treatment Minutes: [address], OTR/L

## 2020-11-10 NOTE — PROGRESS NOTES
[facility]  Internal Medicine Teaching Residency Program  Inpatient Daily Progress Note  ______________________________________________________________________________    Patient: Nick Gonzalez  YOB: 1940   RUST:[id number]    Acct: [de-identified]     Room: [facility]  Admit date: 11/9/2020  Today's date: 11/10/20  Number of days in the hospital: 1    SUBJECTIVE     Admitting Diagnosis: CHA (acute kidney injury) ([facility] [address]    CC: Abdominal pain    Patient seen and examined bedside. Labs and Chart Reviewed. No acute overnight events. States that his abdominal pain has resolved. Creatinine improved from 1.85-->1.67, at baseline. Continuing on IVF at 125 mill per hour. Hemodynamically stable. Hemoglobin stable. ROS:  Constitutional:  negative for chills, fevers, sweats  Respiratory:  negative for cough, dyspnea on exertion, hemoptysis, shortness of breath, wheezing  Cardiovascular:  negative for chest pain, chest pressure/discomfort, lower extremity edema, palpitations  Gastrointestinal:  negative for abdominal pain, constipation, diarrhea, nausea, vomiting  Neurological:  negative for dizziness, headache    BRIEF HISTORY      Patient presented to the ED with complaints of abdominal pain And fatigue. Pain is umbilical, nonradiating, stabbing in nature, 8/10 in severity, not related to food. History of cholecystectomy, [facility]     On examination, abdomen is soft nontender. No ascites. No hepatosplenomegaly. Vitals stable. Maintaining saturations on room air.     Initial labs revealed mild leukocytosis with WBC 11.4. Mild elevated troponins of 33, 34; Creatinine 1.85, BUN 49, bicarb 19, GFR 35. Lipase was not elevated. LFTs not elevated. CT abdomen revealed no acute abnormality.   CT showed 5 mm noncalcified RLL nodule    OBJECTIVE     Vital Signs:  BP (!) 130/59   Pulse 60   Temp 98.1 °F (36.7 °C) (Oral)   Resp 15   Ht 5' 10\" (1.778 m)   Wt 170 lb (77.1 kg)   SpO2 100%   BMI 24.39 kg/m²     Temp (24hrs), Av.7 °F (36.5 °C), Min:97.2 °F (36.2 °C), Max:98.1 °F (36.7 °C)    In: 1360   Out: -     Physical Exam:  Constitutional: This is a well developed, well nourished, 18.5-24.9 - Normal 78y.o. year old male who is alert, oriented, cooperative and in no apparent distress. Head:normocephalic and atraumatic. EENT:  PERRLA. No conjunctival injections. Septum was midline, mucosa was without erythema, exudates or cobblestoning. No thrush was noted. Neck: Supple without thyromegaly. No elevated JVP. Trachea was midline. Respiratory: Chest was symmetrical without dullness to percussion. Breath sounds bilaterally were clear to auscultation. There were no wheezes, rhonchi or rales. There is no intercostal retraction or use of accessory muscles. No egophony noted. Cardiovascular: Regular without murmur, clicks, gallops or rubs. Abdomen: Slightly rounded and soft without organomegaly. No rebound, rigidity or guarding was appreciated. Lymphatic: No lymphadenopathy. Musculoskeletal: Normal curvature of the spine. No gross muscle weakness. Extremities:  No lower extremity edema, ulcerations, tenderness, varicosities or erythema. Muscle size, tone and strength are normal.  No involuntary movements are noted. Skin:  Warm and dry. Good color, turgor and pigmentation. No lesions or scars.   No cyanosis or clubbing  Neurological/Psychiatric: The patient's general behavior, level of consciousness, thought content and emotional status is normal.        Medications:  Scheduled Medications:    atorvastatin  40 mg Oral Daily    sodium chloride flush  10 mL Intravenous 2 times per day    heparin (porcine)  5,000 Units Subcutaneous 3 times per day    carvedilol  6.25 mg Oral BID WC    insulin lispro  0-6 Units Subcutaneous TID WC    insulin lispro  0-3 Units Subcutaneous Nightly    pantoprazole  40 mg Oral QAM secondary to dehydration:  Resolving. Creatinine 1.67 today, at baseline. Cutdown IV fluids to 50 mL/h. Follow BMP.  2. ?  Peptic ulcer disease: Abdominal pain likely secondary to peptic ulcer disease. PPI oral daily. Consult GI. 3. Diabetes mellitus type 2: Hold glipizide and Metformin for now. Low-dose ICS. Hypoglycemia protocol. Will resume glipizide and Metformin upon discharge. 4. h/o apical thrombus: Repeat echo showed no thrombus. Off Coumadin. 5. h/o ACS s/p PCI with stent in 5/2019. Was initially on dual therapy with Brilinta and Coumadin. After apical thrombus has been resolved, patient was started on aspirin and Brilinta for 12 months after PCI. Currently only ASA. 6. Essential hypertension: Continue home medication Coreg. Hold lisinopril and Aldactone for now. Will resume as appropriate. 7. History of nephrolithiasis several years ago. 8. Congenital solitary kidney. 9. Stable small 5 mm RLL lung nodule. No need of screening CT per guidelines. 10. History of diverticulosis. DVT ppx : Heparin 5000 units 3 times daily  GI ppx: Protonix 40 oral daily    PT/OT: PT and OT consulted  Discharge Planning / SW: Discharge likely today if GI is okay. Madhavi Argueta MD  Internal Medicine Resident, PGY-2  [facility]; [place], [place]  11/10/2020, 11:10 AM   Attending Physician Statement  I have discussed the care of Candie Sotelo, including pertinent history and exam findings,  with the resident. I have seen and examined the patient and the key elements of all parts of the encounter have been performed by me. I agree with the assessment, plan and orders as documented by the resident with additions . Treatment plan Discussed with nursing staff in detail , all questions answered .    Electronically signed by Lester Renteria MD on   11/10/20 at 8:39 PM EST    Please note that this chart was generated using voice recognition Dragon dictation software. Although every effort was made to ensure the accuracy of this automated transcription, some errors in transcription may have occurred.

## 2020-11-10 NOTE — ED NOTES
Pt updated on admission and room status, verbalized understanding. Pt provided applesauce per request and ice water. Pt denies further needs. Call light available. Will continue to monitor.         Anuj Sevilla RN  11/09/20 2120

## 2020-11-10 NOTE — H&P
normocytic anemia Hb 11.1. Lipase was not elevated. LFTs not elevated. CT abdomen revealed no acute intra-abdominal abnormality. CT showed 5 mm noncalcified RLL nodule. Review of Systems:  Review of Systems   Constitutional: Positive for appetite change. Negative for activity change, chills, diaphoresis, fatigue and fever. Respiratory: Positive for cough. Negative for shortness of breath and wheezing. Cardiovascular: Negative for chest pain. Gastrointestinal: Positive for abdominal pain and constipation. Negative for blood in stool, diarrhea, nausea and vomiting. Genitourinary: Negative for difficulty urinating, flank pain and hematuria. Musculoskeletal: Negative for arthralgias, back pain, joint swelling and myalgias. Skin: Positive for pallor. PAST MEDICAL HISTORY     Past Medical History:   Diagnosis Date    Diverticulosis of colon     DM (diabetes mellitus) ([facility] [address]     History of colon polyps 10/2016    Hyperlipidemia     Hypertension     Trigger finger     Tubular adenoma of colon 10/05/2016       PAST SURGICAL HISTORY     Past Surgical History:   Procedure Laterality Date    CHOLECYSTECTOMY      COLONOSCOPY      COLONOSCOPY  10/05/2016    polyp--tubular adenoma, diverticulosis    HERNIA REPAIR Left     inguinal       ALLERGIES     Patient has no known allergies. MEDICATIONS PRIOR TO ADMISSION     Prior to Admission medications    Medication Sig Start Date End Date Taking?  Authorizing Provider   warfarin (COUMADIN) 2 MG tablet Take 2 mg by mouth   Yes Historical Provider, MD   glimepiride (AMARYL) 2 MG tablet Take 1 tablet by mouth 2 times daily 4/14/17   Larinda Kehr, MD   lisinopril-hydrochlorothiazide [facility]) 20-12.5 MG per tablet Take 1 tablet by mouth daily 4/14/17   Larinda Kehr, MD   metFORMIN (GLUCOPHAGE) 500 MG tablet Take 1 tablet by mouth 2 times daily (with meals) 4/14/17   Larinda Kehr, MD   simvastatin (ZOCOR) 40 MG tablet Take 1 tablet by mouth nightly 17   Jon Soriano MD   aspirin 81 MG tablet Take 81 mg by mouth daily    Historical Provider, MD   loperamide (RA ANTI-DIARRHEAL) 2 MG capsule Take 1 capsule by mouth daily 10/26/16   Debbie Carrero MD   Blood Gluc Meter Disp-Strips (BLOOD GLUCOSE METER DISPOSABLE) NELL Blood glucose meter and test strips DX: DM type 2 3/15/16   oJn Soriano MD       SOCIAL HISTORY     Tobacco: Denies  Alcohol: Denies  Illicits: Denies    FAMILY HISTORY     Family History   Problem Relation Age of Onset    Cancer Mother         unknown site    Heart Disease Father     Cancer Brother         unknown site       PHYSICAL EXAM     Vitals: /64   Pulse 74   Temp 97.7 °F (36.5 °C) (Oral)   Resp 16   Ht 5' 10\" (1.778 m)   Wt 170 lb (77.1 kg)   SpO2 100%   BMI 24.39 kg/m²   Tmax: Temp (24hrs), Av.5 °F (36.4 °C), Min:97.2 °F (36.2 °C), Max:97.7 °F (36.5 °C)    Last Body weight:   Wt Readings from Last 3 Encounters:   20 170 lb (77.1 kg)   17 144 lb (65.3 kg)   17 149 lb (67.6 kg)     Body Mass Index : Body mass index is 24.39 kg/m². PHYSICAL EXAMINATION:  Constitutional: This is a well developed, well nourished, 18.5-24.9 - Normal 78y.o. year old male who is alert, oriented, cooperative and in no apparent distress. Head:normocephalic and atraumatic. EENT:  PERRLA. No conjunctival injections. Septum was midline, mucosa was without erythema, exudates or cobblestoning. No thrush was noted. Neck: Supple without thyromegaly. No elevated JVP. Trachea was midline. Respiratory: Chest was symmetrical without dullness to percussion. Breath sounds bilaterally were clear to auscultation. There were no wheezes, rhonchi or rales. There is no intercostal retraction or use of accessory muscles. No egophony noted. Cardiovascular: Regular without murmur, clicks, gallops or rubs.    Abdomen: Slightly rounded and soft without organomegaly. No rebound, rigidity or guarding was appreciated. No abdominal tenderness. Lymphatic: No lymphadenopathy. Musculoskeletal: Normal curvature of the spine. No gross muscle weakness. Extremities:  No lower extremity edema, ulcerations, tenderness, varicosities or erythema. Muscle size, tone and strength are normal.  No involuntary movements are noted. Skin:  Warm and dry. Good color, turgor and pigmentation. No lesions or scars.   No cyanosis or clubbing  Neurological/Psychiatric: The patient's general behavior, level of consciousness, thought content and emotional status is normal.          INVESTIGATIONS     Laboratory Testing:     Recent Results (from the past 24 hour(s))   LIPASE    Collection Time: 11/09/20  3:09 PM   Result Value Ref Range    Lipase 56 13 - 60 U/L   Troponin    Collection Time: 11/09/20  3:09 PM   Result Value Ref Range    Troponin, High Sensitivity 33 (H) 0 - 22 ng/L    Troponin T NOT REPORTED <0.03 ng/mL    Troponin Interp NOT REPORTED    CBC WITH AUTO DIFFERENTIAL    Collection Time: 11/09/20  3:09 PM   Result Value Ref Range    WBC 11.4 (H) 3.5 - 11.3 k/uL    RBC 3.76 (L) 4.21 - 5.77 m/uL    Hemoglobin 11.1 (L) 13.0 - 17.0 g/dL    Hematocrit 34.6 (L) 40.7 - 50.3 %    MCV 92.0 82.6 - 102.9 fL    MCH 29.5 25.2 - 33.5 pg    MCHC 32.1 28.4 - 34.8 g/dL    RDW 14.6 (H) 11.8 - 14.4 %    Platelets 561 216 - 678 k/uL    MPV 10.1 8.1 - 13.5 fL    NRBC Automated 0.0 0.0 per 100 WBC    Differential Type NOT REPORTED     Seg Neutrophils 78 (H) 36 - 65 %    Lymphocytes 15 (L) 24 - 43 %    Monocytes 6 3 - 12 %    Eosinophils % 0 (L) 1 - 4 %    Basophils 1 0 - 2 %    Immature Granulocytes 0 0 %    Segs Absolute 8.84 (H) 1.50 - 8.10 k/uL    Absolute Lymph # 1.68 1.10 - 3.70 k/uL    Absolute Mono # 0.67 0.10 - 1.20 k/uL    Absolute Eos # <0.03 0.00 - 0.44 k/uL    Basophils Absolute 0.10 0.00 - 0.20 k/uL    Absolute Immature Granulocyte 0.05 0.00 - 0.30 k/uL    WBC Morphology NOT REPORTED     RBC Morphology ANISOCYTOSIS PRESENT     Platelet Estimate NOT REPORTED    BASIC METABOLIC PANEL    Collection Time: 11/09/20  3:09 PM   Result Value Ref Range    Glucose 178 (H) 70 - 99 mg/dL    BUN 49 (H) 8 - 23 mg/dL    CREATININE 1.85 (H) 0.70 - 1.20 mg/dL    Bun/Cre Ratio NOT REPORTED 9 - 20    Calcium 9.9 8.6 - 10.4 mg/dL    Sodium 137 135 - 144 mmol/L    Potassium 4.5 3.7 - 5.3 mmol/L    Chloride 103 98 - 107 mmol/L    CO2 19 (L) 20 - 31 mmol/L    Anion Gap 15 9 - 17 mmol/L    GFR Non-African American 35 (L) >60 mL/min    GFR  43 (L) >60 mL/min    GFR Comment          GFR Staging NOT REPORTED    Protime-INR    Collection Time: 11/09/20  3:09 PM   Result Value Ref Range    Protime 11.1 9.0 - 12.0 sec    INR 1.1    Troponin    Collection Time: 11/09/20  4:47 PM   Result Value Ref Range    Troponin, High Sensitivity 34 (H) 0 - 22 ng/L    Troponin T NOT REPORTED <0.03 ng/mL    Troponin Interp NOT REPORTED    Hepatic Function Panel    Collection Time: 11/09/20  4:47 PM   Result Value Ref Range    Alb 4.5 3.5 - 5.2 g/dL    Alkaline Phosphatase 94 40 - 129 U/L    ALT 13 5 - 41 U/L    AST 19 <40 U/L    Total Bilirubin 0.87 0.3 - 1.2 mg/dL    Bilirubin, Direct 0.26 <0.31 mg/dL    Bilirubin, Indirect 0.61 0.00 - 1.00 mg/dL    Total Protein 7.0 6.4 - 8.3 g/dL    Globulin NOT REPORTED 1.5 - 3.8 g/dL    Albumin/Globulin Ratio 1.8 1.0 - 2.5   Troponin    Collection Time: 11/09/20 10:54 PM   Result Value Ref Range    Troponin, High Sensitivity 35 (H) 0 - 22 ng/L    Troponin T NOT REPORTED <0.03 ng/mL    Troponin Interp NOT REPORTED    POC Glucose Fingerstick    Collection Time: 11/09/20 11:08 PM   Result Value Ref Range    POC Glucose 148 (H) 75 - 110 mg/dL       Imaging:   Ct Abdomen Pelvis Wo Contrast Additional Contrast? None    Result Date: 11/9/2020  1. No acute intra-abdominal abnormality. 2. 5 mm noncalcified right lower lobe nodule.  RECOMMENDATIONS: Guidelines for follow-up and management of pulmonary nodules found on incomplete chest CT: <6 mm - No follow up recommend on the basis of the estimated low risk of malignancy. Reference:  Radiology. 2017 Jul; 284(1):228-243       ASSESSMENT & PLAN     ASSESSMENT / PLAN:     Active Problems:    Type 2 diabetes mellitus without complication ([facility] [address]    Hypertension    CHA (acute kidney injury) ([facility] [address]    Solitary kidney, congenital    S/P coronary artery stent placement    GERD (gastroesophageal reflux disease)  Resolved Problems:    * No resolved hospital problems. *    1. CHA likely prerenal secondary to dehydration. Creatinine 1.85. IV fluids at 125 mill per hour. BMP tomorrow a.m. Check FeNa. Renal ultrasound. UA with reflex culture. 2. Diabetes mellitus type 2: Hold glipizide and Metformin for now. Low-dose insulin sliding scale. Hypoglycemia protocol. 3. Possible gastritis, GERD. Start Protonix 40 mg once daily. F/u on H. Pylori Ag. Follow up outpatient with GI.     4. H/o apical thrombus: Repeat echo showed no thrombus. Off Coumadin. 5. H/o ACS s/p PCI with stent in 5/2019. Was initially on dual therapy with Brilinta and Coumadin. After apical thrombus has been resolved, patient was started on aspirin and Brilinta. However patient was not taking aspirin. 6. Essential hypertension: Continue home medication Coreg. Hold lisinopril and Aldactone for now. 7. H/o nephrolithiasis. 8. Congenital solitary kidney. 9. Stable small 5 mm RLL lung nodule. No need of screening CT per guidelines. 10. H/o diverticulosis    DVT ppx: Heparin 5000 units subcutaneous 3 times daily  GI ppx: Protonix 40 mg once daily    PT/OT/SW. Consulted  Discharge Planning: Ongoing    Jaida Saenz MD  Internal Medicine Resident, PGY-1  [facility];  [place], [place]  11/10/2020, 12:12 AM   Attending Physician Statement  I have discussed the care of Germán Gomez, including pertinent history and exam findings,  with

## 2020-11-10 NOTE — PROGRESS NOTES
SENIOR NOTE      This is a 78 y.o. male admitted 11/9/2020 for CHA (acute kidney injury) ([facility] [address] [N17.9]. See H&P of admitting/intern resident for more details. Patient presented to the ED with complaints of abdominal pain And fatigue. Pain is umbilical, nonradiating, stabbing in nature, 8/10 in severity, not related to food. History of cholecystectomy, [facility]nia repair. [facility] On examination, abdomen is soft nontender. No ascites. No hepatosplenomegaly. Vitals stable. Maintaining saturations on room air. Initial labs revealed mild leukocytosis with WBC 11.4. Mild elevated troponins of 33, 34; Creatinine 1.85, BUN 49, bicarb 19, GFR 35. Lipase was not elevated. LFTs not elevated. CT abdomen revealed no acute abnormality. CT showed 5 mm noncalcified RLL nodule      Assessment    Active Problems:    Type 2 diabetes mellitus without complication (HCC)    Hypertension    CHA (acute kidney injury) ([facility] [address]    Solitary kidney, congenital    S/P coronary artery stent placement  Resolved Problems:    * No resolved hospital problems. *        Plan     1. CHA likely prerenal secondary to dehydration: Creatinine 1.85. IV fluids at 125 mill per hour. BMP tomorrow a.m. Check urine sodium, creatinine, protein creatinine ratio. Renal ultrasound. UA with reflex culture. 2. Diabetes mellitus type 2: Hold glipizide and Metformin for now. Low-dose ICS. Hypoglycemia vertebral.  3. h/o apical thrombus: Repeat echo showed no thrombus. Off Coumadin. 4. h/o ACS s/p PCI with stent in 5/2019. Was initially on dual therapy with Brilinta and Coumadin. After apical thrombus has been resolved, patient was started on aspirin and Brilinta for 12 months after PCI. Currently only ASA. 5. Essential hypertension: Continue home medication Coreg. Hold lisinopril and Aldactone for now. 6. History of nephrolithiasis several years ago. 7. Solitary kidney. 8. Stable small 5 mm RLL lung nodule.   No need of screening CT per guidelines. 9. History of diverticulosis.       Avis Holt MD            Department of Internal Medicine  [facility], [facility]         11/9/2020, 8:56 PM

## 2020-11-10 NOTE — ED NOTES
Pt resting comfortably awake, denies needs at this time  Equal chest rise/fall, alert, oriented speaking clearly. Call light within reach, bedlocked in lowest position  Will continue to monitor.        Tee Singh RN  11/09/20 1921

## 2020-11-10 NOTE — PROGRESS NOTES
Physical Therapy    Facility/Department: [address] MED SURG  Initial Assessment    NAME: Evita Chan  : 1940  MRN: [id number]    Date of Service: 11/10/2020  The patient is a pleasant 78 y.o. male presents to the ER with a chief complaint of abdominal pain since 3 days. Patient reports he follows up with the [facility], had an appointment at the [facility] for the same and was sent to [facility]. Discharge Recommendations:    No further therapy required at discharge. PT Equipment Recommendations  Equipment Needed: No    Assessment   Assessment: Patient cooperative during evaluation. Pt demo's good balance during ambulation. Pt has no concerns about returning home and reports that someone will be with him . Pt is safe to return home. Prognosis: Good  Decision Making: Low Complexity  PT Education: PT Role;General Safety;Gait Training  REQUIRES PT FOLLOW UP: No  Activity Tolerance  Activity Tolerance: Patient Tolerated treatment well       Patient Diagnosis(es): The primary encounter diagnosis was Periumbilical abdominal pain. A diagnosis of CHA (acute kidney injury) ([address] was also pertinent to this visit. has a past medical history of Diverticulosis of colon, DM (diabetes mellitus) ([address] History of colon polyps, Hyperlipidemia, Hypertension, Trigger finger, and Tubular adenoma of colon. has a past surgical history that includes Cholecystectomy; Colonoscopy; hernia repair (Left); and Colonoscopy (10/05/2016).     Restrictions  Restrictions/Precautions  Restrictions/Precautions: General Precautions, Up as Tolerated  Vision/Hearing  Vision: Impaired  Vision Exceptions: Wears glasses at all times  Hearing: Exceptions to [facility]  Hearing Exceptions: Bilateral hearing aid     Subjective  General  Patient assessed for rehabilitation services?: Yes  Response To Previous Treatment: Not applicable  Family / Caregiver Present: No  Follows Commands: Within Functional Limits  Pain Screening  Patient Currently in Pain: Denies    Orientation  Orientation  Overall Orientation Status: Within Normal Limits  Social/Functional History  Social/Functional History  Lives With: Family(Nephew and his wife)  Type of Home: House  Home Layout: One level  Home Access: Stairs to enter without rails  Entrance Stairs - Number of Steps: 1  Bathroom Shower/Tub: Tub/Shower unit  Bathroom Toilet: Standard  Home Equipment: (no AD)  ADL Assistance: Independent  Homemaking Assistance: Independent  Ambulation Assistance: Independent  Transfer Assistance: Independent  Active : No  Patient's  Info: Nephew drives  Mode of Transportation: Car  Occupation: Retired  Additional Comments: pt reports someone is home with him 24/7  Objective     Observation/Palpation  Posture: Good    AROM RLE (degrees)  RLE AROM: WNL  AROM LLE (degrees)  LLE AROM : WNL  AROM RUE (degrees)  RUE AROM : WFL  AROM LUE (degrees)  LUE AROM : WFL  Strength RLE  Strength RLE: WFL  Strength LLE  Strength LLE: WFL  Strength RUE  Strength RUE: WFL  Strength LUE  Strength LUE: WFL  Tone RLE  RLE Tone: Normotonic  Tone LLE  LLE Tone: Normotonic  Sensation  Overall Sensation Status: WFL(pt reports occasional N/T in toes but is not experiencing currently)  Bed mobility  Supine to Sit: Independent  Comment: pt retired sitting EOB  Transfers  Sit to Stand: Independent  Stand to sit: Independent  Ambulation  Ambulation?: Yes  Ambulation 1  Surface: level tile  Device: No Device  Assistance: Independent  Distance: 240'     Balance  Posture: Good  Sitting - Static: Good  Sitting - Dynamic: Good  Standing - Static: Good  Standing - Dynamic: Fair;+  Comments: standing balance assessed without AD        Plan   Plan  Plan Comment: Pt is safe to return home  Safety Devices  Type of devices:  All fall risk precautions in place, Call light within reach, Gait belt, Left in bed  AM-PAC Score  AM-PAC Inpatient Mobility Raw Score : 24 (11/10/20 0957)  AM-PAC Inpatient T-Scale Score : 61.14 (11/10/20 3985)  Mobility Inpatient CMS 0-100% Score: 0 (11/10/20 0957)  Mobility Inpatient CMS G-Code Modifier : [facility] (11/10/20 0957)    Goals  Short term goals  Time Frame for Short term goals: 1 visit  Short term goal 1: to evaluate and give recommendations--pt safe to return home  Patient Goals   Patient goals : to go home       Therapy Time   Individual Concurrent Group Co-treatment   Time In [id number]         Time Out 0945         Minutes 11              This treatment/evaluation completed by signing SPT. Signing PT agrees with treatment and documentation.     Vasiliy May, Student Physical Therapist

## 2020-11-10 NOTE — CONSULTS
[address]    GASTROENTEROLOGY CONSULT    Patient:   Daphney Mckeon   :    1940   Facility:   [facility]   Date:    11/10/2020  Admission Dx:  CHA ([facility]) ([facility] [N17.9]  Requesting physician: Lynda Wilson MD  Reason for consult:  Melena   CC : epigastric pain, black tarry stools    SUBJECTIVE     HISTORY OF PRESENT ILLNESS  This is a 78 y.o.  male who was admitted 2020 with CHA (acute kidney injury) ([facility] [N17.9]. We have been asked to see the patient in consultation by Lynda Wilson MD for complaint of pain in epigastric region, 1 episode of black tarry stools. According to the patient, he has had 3-4 episodes of recurrent epigastric pain this past year, for which he would take ashlie seltzer. Two days ago, patient developed epigastric pain, along with black tarry stool, for which he visited his PCP and was told to come to the ER. In the ER, patient was diagnosed with CHA secondary to dehydration. Stool was positive for occult blood. Patient had Hb of 11.1 on arrival to ER, today Hb is 9.1. baseline Hb is 10.4-11.0. He had another episode of black tarry stool this morning but no abdominal pain. Patient denied any nausea, vomiting. PMH significant for PCI s/p stent placement in 2019 for ACS, DM type 2, HTN, hyperlipidemia. Patient gave h/o having quit smoking 30 years ago, quit drinking alcohol 20 years ago, currently takes daily aspirin for PCI s/p stent placed in 2019. Patient is currently not on any other anticoagulation or platelet but has used Brilinta and Coumadin in the past for apical mural thrombus, since resolved. He has never had EGD but had colonoscopy in 2016 which showed internal hemorrhoids, mild diverticulosis of sigmoid colon, polyp in righ tcolon which on biopsy was tubular adenoma. He denied any recent appetite or weight changes, any changes in bowel habits.     On physical exam, abdomen is soft, non tender to palpation, bowel sounds present. JESSICA showed no external hemorrhoids, blackish stool smear present. Summary of imaging completed at this time:  CT abdomen without contrast showed cholecystectomy, left nephrectomy, distended stomach but no small bowel dilatation. Colon normal in caliber. Summary of labs completed at this time:  Hb 11.1>>9.1  MCV 94.8, RDW 14.6, platelet count 926>>576  BUN 39, Creatinine 1.67      Previous GI history:   No h/o liver disease, no prior h/o upper GI bleed. He has not had EGD but colonoscopy in 2016 showed internal hemorrhoids, mild diverticulosis of sigmoid colon, polyp in right colon which on biopsy was tubular adenoma. OBJECTIVE:     PAST MEDICAL/SURGICAL HISTORY  Past Medical History:   Diagnosis Date    Diverticulosis of colon     DM (diabetes mellitus) ([facility]     History of colon polyps 10/2016    Hyperlipidemia     Hypertension     Trigger finger     Tubular adenoma of colon 10/05/2016     Past Surgical History:   Procedure Laterality Date    CHOLECYSTECTOMY      COLONOSCOPY      COLONOSCOPY  10/05/2016    polyp--tubular adenoma, diverticulosis    HERNIA REPAIR Left     inguinal       ALLERGIES:  No Known Allergies    HOME MEDICATIONS:  Prior to Admission medications    Medication Sig Start Date End Date Taking?  Authorizing Provider   warfarin (COUMADIN) 2 MG tablet Take 2 mg by mouth   Yes Historical Provider, MD   glimepiride (AMARYL) 2 MG tablet Take 1 tablet by mouth 2 times daily 4/14/17   Kelsey Casas MD   lisinopril-hydrochlorothiazide GARRETT [facility]) 20-12.5 MG per tablet Take 1 tablet by mouth daily 4/14/17   Kelsey Casas MD   metFORMIN (GLUCOPHAGE) 500 MG tablet Take 1 tablet by mouth 2 times daily (with meals) 4/14/17   Kelsey Casas MD   simvastatin (ZOCOR) 40 MG tablet Take 1 tablet by mouth nightly 4/14/17   Kelsey Casas MD   aspirin 81 MG tablet Take 81 mg by mouth daily Historical Provider, MD   loperamide (RA ANTI-DIARRHEAL) 2 MG capsule Take 1 capsule by mouth daily 10/26/16   Brent Joyce MD   Blood Gluc Meter Disp-Strips (BLOOD GLUCOSE METER DISPOSABLE) NELL Blood glucose meter and test strips DX: DM type 2 3/15/16   Oscar Cross MD       CURRENT MEDICATIONS:  Scheduled Meds:   atorvastatin  40 mg Oral Daily    sodium chloride flush  10 mL Intravenous 2 times per day    heparin (porcine)  5,000 Units Subcutaneous 3 times per day    carvedilol  6.25 mg Oral BID WC    insulin lispro  0-6 Units Subcutaneous TID WC    insulin lispro  0-3 Units Subcutaneous Nightly    pantoprazole  40 mg Oral QAM AC     Continuous Infusions:   sodium chloride 50 mL/hr at 11/10/20 1158    dextrose       PRN Meds:sodium chloride flush, acetaminophen **OR** acetaminophen, promethazine **OR** ondansetron, glucose, dextrose, glucagon (rDNA), dextrose    SOCIAL HISTORY:     Tobacco:   reports that he quit smoking about 21 years ago. He has never used smokeless tobacco.  Alcohol:   reports no history of alcohol use. Illicit drugs:  reports no history of drug use. FAMILY HISTORY:     Family History   Problem Relation Age of Onset    Cancer Mother         unknown site    Heart Disease Father     Cancer Brother         unknown site       REVIEW OF SYSTEMS:    Constitutional: No fever, no chills, no lethargy, no weakness. HEENT:  No headache, otalgia, itchy eyes, nasal discharge or sore throat. Cardiac:  No chest pain, dyspnea, orthopnea or PND. Chest:   No cough, phlegm or wheezing. Abdomen:  No abdominal pain, nausea or vomiting. Neuro:  No focal weakness, abnormal movements or seizure like activity. Skin:   No rashes, no itching. :   No hematuria, no pyuria, no dysuria, no flank pain. Extremities:  No swelling or joint pains. ROS was otherwise negative except as mentioned in the [address]      PHYSICAL EXAM:    BP (!) 130/59   Pulse 60   Temp 98.1 °F (36.7 °C) reasonably achievable. COMPARISON: None. HISTORY: ORDERING SYSTEM PROVIDED HISTORY: abdominal pain, hx cholecy TECHNOLOGIST PROVIDED HISTORY: abdominal pain, hx cholecy Reason for Exam: abdominal pain, hx cholecy FINDINGS: Lower Chest: No acute abnormality within the visualized lung bases. 5 mm noncalcified right lower lobe nodule. Coronary artery atherosclerosis Organs: Within the limitations of a noncontrast examination, no acute abnormality within the liver, spleen, pancreas, or adrenal glands. Prior cholecystectomy. Prior left nephrectomy. No nephrolithiasis or hydronephrosis. 2.2 cm simple appearing right lower pole renal cyst. GI/Bowel: Stomach is partially distended. The small bowel is nondilated. The colon is normal in caliber. The appendix is normal in caliber. Pelvis: The bladder is partially distended without vesicular stone. Prostate is within normal limits for size. Peritoneum/Retroperitoneum: No ascites or pneumoperitoneum. Atherosclerosis of the nondilated abdominal aorta. Bones/Soft Tissues: No acute osseous abnormality. Degenerative changes in the lumbar spine. 1. No acute intra-abdominal abnormality. 2. 5 mm noncalcified right lower lobe nodule. RECOMMENDATIONS: Guidelines for follow-up and management of pulmonary nodules found on incomplete chest CT: <6 mm - No follow up recommend on the basis of the estimated low risk of malignancy. Reference:  Radiology. 2017 Jul; 284(1):228-243     Us Renal Complete    Result Date: 11/10/2020  EXAMINATION: RETROPERITONEAL ULTRASOUND OF THE KIDNEYS AND URINARY BLADDER 11/10/2020 COMPARISON: CT dated 11/09/2020 HISTORY: ORDERING SYSTEM PROVIDED HISTORY: mat TECHNOLOGIST PROVIDED HISTORY: mat FINDINGS: Kidneys: Right kidney is approximately 11.1 cm in length. Mild right pelviectasis. A cyst is seen at the lower pole the right kidney measuring approximately 1.9 x 2.1 x 1.9 cm. Left kidney is absent.  Bladder: Bladder initially demonstrates volume 49 cc.  No significant postvoid residual within the bladder. Mild right renal pelviectasis. Absent left kidney. 1.9 cm right renal cyst.       IMPRESSION:     1. Upper GI bleed-etiology chronic NSAID use. Old records, labs and imaging reviewed. PLAN   1. Hold all anticoagulation  2. Repeat CBC in 1 week  3. EGD in 1 week outpatient  4. Follow up with outpatient GI clinic  5. Start protonix 40 mg once daily      This plan was formulated in collaboration with Dr. Татьяна Ortega  Thank you for allowing us to participate in the care of your patient. Electronically signed by:   Nasreen Salazar MD,  PGY-1, [address] [facility] [facility]       Please note that this note was generated using a voice recognition dictation software. Although every effort was made to ensure the accuracy of this automated transcription, some errors in transcription may have occurred.

## 2020-11-10 NOTE — DISCHARGE INSTR - COC
Continuity of Care Form    Patient Name: Tracy Kraft   :  1940  MRN:  [id number]    Admit date:  2020  Discharge date:  ***    Code Status Order: Full Code   Advance Directives:   Advance Care Flowsheet Documentation       Date/Time Healthcare Directive Type of Healthcare Directive Copy in [address] Agent's Name Healthcare Agent's Phone Number    [phone]  No, patient does not have an advance directive for healthcare treatment -- -- -- -- --            Admitting Physician:  Ernestina Rodriguez MD  PCP: Ирина Driver MD    Discharging Nurse: [facility] Unit/Room#: 4399/8073-89  Discharging Unit Phone Number: ***    Emergency Contact:   Extended Emergency Contact Information  Primary Emergency Contact: ShantieddieAndree  Address: [address] [place] [address] Callie [address] Phone: [phone]  Relation: Niece/Nephew    Past Surgical History:  Past Surgical History:   Procedure Laterality Date    CHOLECYSTECTOMY      COLONOSCOPY      COLONOSCOPY  10/05/2016    polyp--tubular adenoma, diverticulosis    HERNIA REPAIR Left     inguinal       Immunization History:   Immunization History   Administered Date(s) Administered    Influenza, High Dose (Fluzone 65 yrs and older) 2016    Pneumococcal Conjugate 13-valent (Estephania Sins) 2016    Pneumococcal Polysaccharide (Oyakbdcvx78) 2017       Active Problems:  Patient Active Problem List   Diagnosis Code    Hypercholesteremia E78.00    Type 2 diabetes mellitus without complication ([facility] L37.8    Hypertension I10    Anemia D64.9    Tubular adenoma of colon D12.6    History of colon polyps Z86.010    Diverticulosis of colon K57.30    [facility] N17.9    Solitary kidney, congenital Q60.0    S/P coronary artery stent placement Z95.5    GERD (gastroesophageal reflux disease) K21.9       Isolation/Infection:   Isolation            No Isolation          Patient Infection Status Vent List:[id number]:::0}    Rehab Therapies: {THERAPEUTIC INTERVENTION:2004881022}  Weight Bearing Status/Restrictions: {MH CC Weight Bearin:::0}  Other Medical Equipment (for information only, NOT a DME order):  {EQUIPMENT:503747592}  Other Treatments: ***    Patient's personal belongings (please select all that are sent with patient):  {CHP DME Belongings:987770167:::0}    RN SIGNATURE:  {Esignature:635116472:::0}    CASE MANAGEMENT/SOCIAL WORK SECTION    Inpatient Status Date: ***    Readmission Risk Assessment Score:  Readmission Risk              Risk of Unplanned Readmission:        16           Discharging to Facility/ Agency   Name:   Address:  Phone:  Fax:    Dialysis Facility (if applicable)   Name:  Address:  Dialysis Schedule:  Phone:  Fax:    / signature: {Esignature:494372072:::0}    PHYSICIAN SECTION    Prognosis: Fair    Condition at Discharge: Stable    Rehab Potential (if transferring to Rehab): Fair    Recommended Labs or Other Treatments After Discharge: as per PCP and GI    Physician Certification: I certify the above information and transfer of Samir Torres  is necessary for the continuing treatment of the diagnosis listed and that he requires [address] for greater 30 days.      Update Admission H&P: No change in H&P    PHYSICIAN SIGNATURE:  Electronically signed by Ressie Sever, MD on 11/10/20 at 10:47 AM EST

## 2020-11-11 ENCOUNTER — CARE COORDINATION (OUTPATIENT)
Dept: CASE MANAGEMENT | Age: 80
End: 2020-11-11

## 2020-11-11 LAB
EKG ATRIAL RATE: 71 BPM
EKG P AXIS: 53 DEGREES
EKG P-R INTERVAL: 162 MS
EKG Q-T INTERVAL: 422 MS
EKG QRS DURATION: 124 MS
EKG QTC CALCULATION (BAZETT): 458 MS
EKG R AXIS: 18 DEGREES
EKG T AXIS: 32 DEGREES
EKG VENTRICULAR RATE: 71 BPM

## 2020-11-11 PROCEDURE — 93010 ELECTROCARDIOGRAM REPORT: CPT | Performed by: INTERNAL MEDICINE

## 2020-11-11 NOTE — CARE COORDINATION
[address] Transitions Initial Follow Up Call    Call within 2 business days of discharge: Yes    Patient: Yaima Sommers Patient : 1940   MRN: [id number]  Reason for Admission: Periumbilical abdominal pain  Discharge Date: 11/10/20 RARS: Readmission Risk Score: 14    BPCI-A Medical Bundle Patient:  Working DR Renal Failure  Admitting Location: [facility]  Adm Date: 20  Date of Discharge: 11/10/20     Bundle End Date: 21    Last Discharge [facility]       Complaint Diagnosis Description Type Department Provider    20 Abdominal Pain Periumbilical abdominal pain . .. ED to Hosp-Admission (Discharged) (ADMITTED) CHINTAN Elie Sacks, MD; Larissa Ramirez Wh. ..           1st attempt to reach patient for initial BPCI outreach. [facility] requesting return call. Contact information provided. [address] Transitions 24 Hour Call    Care Transitions Interventions         Follow Up  No future appointments.     Olya Vega RN

## 2020-11-12 ENCOUNTER — CARE COORDINATION (OUTPATIENT)
Dept: CASE MANAGEMENT | Age: 80
End: 2020-11-12

## 2020-11-12 NOTE — CARE COORDINATION
[address] Transitions Initial Follow Up Call    Call within 2 business days of discharge: Yes    Patient: Mallory Potter Patient : 1940   MRN: [id number]  Reason for Admission: Periumbilical abdominal norma  Discharge Date: 11/10/20 RARS: Readmission Risk Score: 14    BPCI-A Medical Bundle Patient:  Working DR Renal Failure  Admitting Location: [facility]  Adm Date: 20  Date of Discharge: 11/10/20     Bundle End Date: 21    Last Discharge [facility]       Complaint Diagnosis Description Type Department Provider    20 Abdominal Pain Periumbilical abdominal pain . .. ED to Hosp-Admission (Discharged) (ADMITTED) ROLAND Welch MD; Raj Strange. .. Spoke with: Mallory Potter    Was able to contact [facility] for initial [employer] He stated that he was doing \"ok\". He said that he still has the abdominal pain about his naval, but is has lessen. He had a stool this morning and it is not as dark as it was. He denied shortness of breath, chest pain, dizziness/lightheadedness, cough, fever/chills and his appetite is slowly returning. He said that he is still a little weak. He obtained the new medication at the hospital and has stopped the ones that have been discontinued. No home care at discharge. Reviewed up coming lab work and he said that it had to be approved by the [place] before he can have them drawn. Also noted in GI note that protonix was to be started and medication was not found on medication list.  He once again stated that it had to be approved by the [place]. [facility] about [facility]. [facility] and was unable to talk with nursing staff at the clinic. Detail message left about follow up lab work and the protonix. Name and phone number provided. Explained BPCI program and [facility] was receptive to further outreach. Hand off to [facility] for 90 outreach.     Facility: [unfilled]    Non-face-to-face services provided:  Scheduled medications. Advised obtaining a 90-day supply of all daily and as-needed medications. Covid Risk Education    Patient has following risk factors of: diabetes. Education provided regarding infection prevention, and signs and symptoms of COVID-19 and when to seek medical attention with    who verbalized understanding. Discussed exposure protocols and quarantine From CDC: Are you at higher risk for severe illness?   and given an opportunity for questions and concerns. The patient agrees to contact the COVID-19 hotline [phone] or PCP office for questions related to COVID-19. For more information on steps you can take to protect yourself, see CDC's How to Protect Yourself     Patient/family/caregiver given information for [employer] and agrees to enroll no  Patient's preferred e-mail: declines  Patient's preferred phone number: declines    Discussed follow-up appointments. If no appointment was previously scheduled, appointment scheduling offered: No. Is follow up appointment scheduled within 7 days of discharge? Yes  [facility]    Plan for follow-up call in 7-10 days based on severity of symptoms and risk factors. Plan for next call: symptom management-GI bleed  CTN provided contact information for future needs.           Care Transitions 24 Hour Call    Do you have any ongoing symptoms?:  Yes  Patient-reported symptoms:  Abdominal Pain  Do you have a copy of your discharge instructions?:  Yes  Do you have all of your prescriptions and are they filled?:  Yes  Have you been contacted by a [address]?:  No  Have you scheduled your follow up appointment?:  Yes  How are you going to get to your appointment?:  Car - family or friend to transport  Were you discharged with any Home Care or Post Acute Services:  No  Do you feel like you have everything you need to keep you well at home?:  Yes  Care Transitions Interventions         Follow Up  No future

## 2020-11-13 ENCOUNTER — TELEPHONE (OUTPATIENT)
Dept: GASTROENTEROLOGY | Age: 80
End: 2020-11-13

## 2020-11-16 ENCOUNTER — OFFICE VISIT (OUTPATIENT)
Dept: GASTROENTEROLOGY | Age: 80
End: 2020-11-16
Payer: COMMERCIAL

## 2020-11-16 VITALS
DIASTOLIC BLOOD PRESSURE: 62 MMHG | BODY MASS INDEX: 22.6 KG/M2 | WEIGHT: 157.5 LBS | HEART RATE: 66 BPM | SYSTOLIC BLOOD PRESSURE: 131 MMHG | TEMPERATURE: 96.8 F | OXYGEN SATURATION: 99 %

## 2020-11-16 PROCEDURE — 99204 OFFICE O/P NEW MOD 45 MIN: CPT | Performed by: INTERNAL MEDICINE

## 2020-11-16 PROCEDURE — APPSS45 APP SPLIT SHARED TIME 31-45 MINUTES: Performed by: NURSE PRACTITIONER

## 2020-11-16 RX ORDER — FOLIC ACID 1 MG/1
TABLET ORAL
Status: ON HOLD | COMMUNITY
Start: 2020-02-13 | End: 2020-11-30

## 2020-11-16 RX ORDER — ATORVASTATIN CALCIUM 80 MG/1
TABLET, FILM COATED ORAL
COMMUNITY
Start: 2020-02-13

## 2020-11-16 RX ORDER — SPIRONOLACTONE 25 MG/1
TABLET ORAL
COMMUNITY
Start: 2020-08-10

## 2020-11-16 RX ORDER — GLIPIZIDE 5 MG/1
TABLET ORAL
COMMUNITY
Start: 2020-02-13

## 2020-11-16 RX ORDER — OMEGA-3S/DHA/EPA/FISH OIL/D3 300MG-1000
400 CAPSULE ORAL DAILY
Status: ON HOLD | COMMUNITY
End: 2020-11-30 | Stop reason: ALTCHOICE

## 2020-11-16 ASSESSMENT — ENCOUNTER SYMPTOMS
ABDOMINAL DISTENTION: 0
ABDOMINAL PAIN: 0
TROUBLE SWALLOWING: 0
SHORTNESS OF BREATH: 0
SORE THROAT: 0
SINUS PRESSURE: 0
DIARRHEA: 0
SINUS PAIN: 0
VOMITING: 0
ANAL BLEEDING: 0
NAUSEA: 0
WHEEZING: 0
CHOKING: 0
VOICE CHANGE: 0
BACK PAIN: 0
COUGH: 1
CONSTIPATION: 1
RECTAL PAIN: 0
BLOOD IN STOOL: 0

## 2020-11-16 NOTE — PROGRESS NOTES
MEDICATIONS:    Current Outpatient Medications:     spironolactone (ALDACTONE) 25 MG tablet, TAKE ONE-HALF TABLET BY MOUTH ONCE DAILY (WATER PILL), Disp: , Rfl:     atorvastatin (LIPITOR) 80 MG tablet, TAKE ONE TABLET BY MOUTH ONCE DAILY FOR CHOLESTEROL - CALL YOUR PROVIDER IF YOU HAVE UNEXPLAINED MUSCLE PAIN, TENDERNESS OR WEAKNESS., Disp: , Rfl:     glipiZIDE (GLUCOTROL) 5 MG tablet, TAKE ONE TABLET BY MOUTH TWICE DAILY 30 MINUTES BEFORE BREAKFAST AND DINNER FOR DIABETES, Disp: , Rfl:     folic acid (FOLVITE) 1 MG tablet, TAKE ONE TABLET BY MOUTH ONCE DAILY, Disp: , Rfl:     vitamin D3 (CHOLECALCIFEROL) 10 MCG (400 UNIT) TABS tablet, Take 400 Units by mouth daily, Disp: , Rfl:     carvedilol (COREG) 6.25 MG tablet, Take 1 tablet by mouth 2 times daily (with meals), Disp: 60 tablet, Rfl: 3    Blood Gluc Meter Disp-Strips (BLOOD GLUCOSE METER DISPOSABLE) NELL, Blood glucose meter and test strips DX: DM type 2, Disp: 1 Device, Rfl: 0    glimepiride (AMARYL) 2 MG tablet, Take 1 tablet by mouth 2 times daily (Patient not taking: Reported on 11/16/2020), Disp: 30 tablet, Rfl: 3    metFORMIN (GLUCOPHAGE) 500 MG tablet, Take 1 tablet by mouth 2 times daily (with meals) (Patient not taking: Reported on 11/16/2020), Disp: 60 tablet, Rfl: 3    simvastatin (ZOCOR) 40 MG tablet, Take 1 tablet by mouth nightly (Patient not taking: Reported on 11/16/2020), Disp: 30 tablet, Rfl: 3    loperamide (RA ANTI-DIARRHEAL) 2 MG capsule, Take 1 capsule by mouth daily (Patient not taking: Reported on 11/16/2020), Disp: 30 capsule, Rfl: 1    ALLERGIES:   No Known Allergies    FAMILY HISTORY:       Problem Relation Age of Onset    Cancer Mother         unknown site    Heart Disease Father     Cancer Brother         unknown site         SOCIAL HISTORY:   Social History     Socioeconomic History    Marital status: Single     Spouse name: Not on file    Number of children: Not on file    Years of education: Not on file   Zannie Cowden Highest education level: Not on file   Occupational History    Not on file   Social Needs    Financial resource strain: Not on file    Food insecurity     Worry: Not on file     Inability: Not on file    Transportation needs     Medical: Not on file     Non-medical: Not on file   Tobacco Use    Smoking status: Former Smoker     Last attempt to quit: 3/15/1999     Years since quittin.6    Smokeless tobacco: Never Used   Substance and Sexual Activity    Alcohol use: No     Alcohol/week: 0.0 standard drinks    Drug use: No    Sexual activity: Not on file   Lifestyle    Physical activity     Days per week: Not on file     Minutes per session: Not on file    Stress: Not on file   Relationships    Social connections     Talks on phone: Not on file     Gets together: Not on file     Attends [employer] service: Not on file     Active member of club or organization: Not on file     Attends meetings of clubs or organizations: Not on file     Relationship status: Not on file    Intimate partner violence     Fear of current or ex partner: Not on file     Emotionally abused: Not on file     Physically abused: Not on file     Forced sexual activity: Not on file   Other Topics Concern    Not on file   Social History Narrative    Not on file       REVIEW OF SYSTEMS:       Review of Systems   Constitutional: Negative for appetite change, fatigue and unexpected weight change. HENT: Negative for sinus pressure, sinus pain, sore throat, trouble swallowing and voice change. Eyes: Positive for visual disturbance (glasses). Respiratory: Positive for cough. Negative for choking, shortness of breath and wheezing. Gastrointestinal: Positive for constipation. Negative for abdominal distention, abdominal pain, anal bleeding, blood in stool, diarrhea, nausea, rectal pain and vomiting. Genitourinary: Negative for difficulty urinating. Musculoskeletal: Negative for arthralgias, back pain and gait problem. Neurological: Negative for dizziness, weakness, light-headedness, numbness and headaches. Hematological: Bruises/bleeds easily. Psychiatric/Behavioral: Negative for sleep disturbance. The patient is not nervous/anxious. PHYSICAL EXAMINATION: Vital signs reviewed per the nursing documentation. /62   Pulse 66   Temp 96.8 °F (36 °C)   Wt 157 lb 8 oz (71.4 kg)   SpO2 99%   BMI 22.60 kg/m²   Body mass index is 22.6 kg/m². Physical Exam  Vitals signs and nursing note reviewed. Constitutional:       General: He is not in acute distress. Appearance: He is well-developed. HENT:      Head: Normocephalic and atraumatic. Mouth/Throat:      Pharynx: No oropharyngeal exudate. Eyes:      General: No scleral icterus. Conjunctiva/sclera: Conjunctivae normal.      Pupils: Pupils are equal, round, and reactive to light. Neck:      Musculoskeletal: Normal range of motion and neck supple. Thyroid: No thyromegaly. Trachea: No tracheal deviation. Cardiovascular:      Rate and Rhythm: Normal rate and regular rhythm. Heart sounds: Normal heart sounds. No murmur. Pulmonary:      Effort: Pulmonary effort is normal. No respiratory distress. Breath sounds: Normal breath sounds. No wheezing or rales. Abdominal:      General: Bowel sounds are normal. There is no distension. Palpations: Abdomen is soft. There is no hepatomegaly or mass. Tenderness: There is no abdominal tenderness. There is no guarding or rebound. Hernia: No hernia is present. Genitourinary:     Rectum: Normal. Guaiac result negative. Lymphadenopathy:      Cervical: No cervical adenopathy. Skin:     General: Skin is warm and dry. Findings: No erythema or rash. Neurological:      Mental Status: He is alert and oriented to person, place, and time. Cranial Nerves: No cranial nerve deficit. Psychiatric:         Thought Content:  Thought content normal.           LABORATORY DATA: Reviewed  Lab Results   Component Value Date    WBC 7.0 11/10/2020    HGB 9.1 (L) 11/10/2020    HCT 29.4 (L) 11/10/2020    MCV 94.8 11/10/2020     11/10/2020     11/10/2020    K 4.3 11/10/2020     (H) 11/10/2020    CO2 17 (L) 11/10/2020    BUN 39 (H) 11/10/2020    CREATININE 1.67 (H) 11/10/2020    LABALBU 4.5 11/09/2020    BILITOT 0.87 11/09/2020    ALKPHOS 94 11/09/2020    AST 19 11/09/2020    ALT 13 11/09/2020    INR 1.1 11/09/2020         Lab Results   Component Value Date    RBC 3.10 (L) 11/10/2020    HGB 9.1 (L) 11/10/2020    MCV 94.8 11/10/2020    MCH 29.4 11/10/2020    MCHC 31.0 11/10/2020    RDW 14.6 (H) 11/10/2020    MPV 10.3 11/10/2020    BASOPCT 1 11/10/2020    LYMPHSABS 1.51 11/10/2020    MONOSABS 0.61 11/10/2020    NEUTROABS 4.75 11/10/2020    EOSABS 0.06 11/10/2020    BASOSABS 0.06 11/10/2020         DIAGNOSTIC TESTING:     Ct Abdomen Pelvis Wo Contrast Additional Contrast? None    Result Date: 11/9/2020  EXAMINATION: CT OF THE ABDOMEN AND PELVIS WITHOUT CONTRAST 11/9/2020 3:59 pm TECHNIQUE: CT of the abdomen and pelvis was performed without the administration of intravenous contrast. Multiplanar reformatted images are provided for review. Dose modulation, iterative reconstruction, and/or weight based adjustment of the mA/kV was utilized to reduce the radiation dose to as low as reasonably achievable. COMPARISON: None. HISTORY: ORDERING SYSTEM PROVIDED HISTORY: abdominal pain, hx cholecy TECHNOLOGIST PROVIDED HISTORY: abdominal pain, hx cholecy Reason for Exam: abdominal pain, hx cholecy FINDINGS: Lower Chest: No acute abnormality within the visualized lung bases. 5 mm noncalcified right lower lobe nodule. Coronary artery atherosclerosis Organs: Within the limitations of a noncontrast examination, no acute abnormality within the liver, spleen, pancreas, or adrenal glands. Prior cholecystectomy. Prior left nephrectomy. No nephrolithiasis or hydronephrosis.   2.2 cm simple appearing right lower pole renal cyst. GI/Bowel: Stomach is partially distended. The small bowel is nondilated. The colon is normal in caliber. The appendix is normal in caliber. Pelvis: The bladder is partially distended without vesicular stone. Prostate is within normal limits for size. Peritoneum/Retroperitoneum: No ascites or pneumoperitoneum. Atherosclerosis of the nondilated abdominal aorta. Bones/Soft Tissues: No acute osseous abnormality. Degenerative changes in the lumbar spine. 1. No acute intra-abdominal abnormality. 2. 5 mm noncalcified right lower lobe nodule. RECOMMENDATIONS: Guidelines for follow-up and management of pulmonary nodules found on incomplete chest CT: <6 mm - No follow up recommend on the basis of the estimated low risk of malignancy. Reference:  Radiology. 2017 Jul; 284(1):228-243     Us Renal Complete    Result Date: 11/10/2020  EXAMINATION: RETROPERITONEAL ULTRASOUND OF THE KIDNEYS AND URINARY BLADDER 11/10/2020 COMPARISON: CT dated 11/09/2020 HISTORY: ORDERING SYSTEM PROVIDED HISTORY: mat TECHNOLOGIST PROVIDED HISTORY: mat FINDINGS: Kidneys: Right kidney is approximately 11.1 cm in length. Mild right pelviectasis. A cyst is seen at the lower pole the right kidney measuring approximately 1.9 x 2.1 x 1.9 cm. Left kidney is absent. Bladder: Bladder initially demonstrates volume 49 cc. No significant postvoid residual within the bladder. Mild right renal pelviectasis. Absent left kidney. 1.9 cm right renal cyst.          IMPRESSION: Mr. Nimco iH is a 78 y.o. male with    Diagnosis Orders   1. Epigastric abdominal pain  EGD   2. Melena  EGD         Assessment:  1. Epigastric abdominal pain    2. Melena        Plan: At present patient is stable. No further melena, epigastric pain. Stool negative for occult blood. Will arrange EGD.     The patient was counseled at length about the risks of prudence Covid-19 during their perioperative period and any recovery window from their procedure. The patient was made aware that prudence Covid-19  may worsen their prognosis for recovering from their procedure  and lend to a higher morbidity and/or mortality risk. All material risks, benefits, and reasonable alternatives including postponing the procedure were discussed. The patient does wish to proceed with the procedure at this time. The Endoscopic procedure was explained to the patient in detail  The prep and NPO were explained  All the Risks, Benefits, and Alternatives were explained  Risk of Bleeding, Perforation and Cardio Respiratory risks were explained  his questions were answered  The procedure has been scheduled with the  in the office  Patient was asked to give us a call for any questions  The patient has verbalized understanding and agreement to this plan. I independently performed an evaluation on [address] [facility]. I have reviewed the above documentation completed by the Nurse Practitioner and agree with the documented findings and plan of care. I have personally evaluated this patient with the APRN and have completed at least one if not all key elements of the E/M (history, physical exam, and MDM). Please see my additional contributions to the HPI, physical exam, assessment, and medical decision making. Spent 30 minutes providing patient education and counseling. Thank you for allowing me to participate in the care of Mr. Madhuri Gonzalez. For any further questions please do not hesitate to contact me. Note is dictated utilizing voice recognition software. Unfortunately this leads to occasional typographical errors. Please contact our office if you have any questions. I have reviewed and agree with the MA/LPN ROS.      Mary Kate Valencia MD, [facility]  Board Certified in Gastroenterology and [address] [place] [address] Gastroenterology  Office #: [phone]

## 2020-11-19 ENCOUNTER — CARE COORDINATION (OUTPATIENT)
Dept: CASE MANAGEMENT | Age: 80
End: 2020-11-19

## 2020-11-19 NOTE — CARE COORDINATION
[facility] Transitions Follow Up Call    2020    Patient: Prince Gunderson  Patient : 1940   MRN: <[id number]>  Reason for Admission:   Discharge Date: 11/10/20 RARS: Readmission Risk Score: 14         Attempted to reach patient by phone for follow up; BPCI-A. Party answering phone stated patient was not home. Will attempt to contact patient at a later time.      Parminder Rodrigues RN

## 2020-11-24 NOTE — DISCHARGE SUMMARY
BMP.  2. Peptic ulcer disease: Abdominal pain likely secondary to peptic ulcer disease. PPI oral daily. Consult GI. 3. Diabetes mellitus type 2: Hold glipizide and Metformin for now.  Low-dose ICS.  Hypoglycemia protocol. Will resume glipizide and Metformin upon discharge. 4. h/o apical thrombus: Repeat echo showed no thrombus.  Off Coumadin. 5. h/o ACS s/p PCI with stent in 5/2019.  Was initially on dual therapy with Brilinta and Coumadin.  After apical thrombus has been resolved, patient was started on aspirin and Brilinta for 12 months after PCI. Currently only ASA. 6. Essential hypertension: Continue home medication Coreg.  Hold lisinopril and Aldactone for now. Will resume as appropriate. 7. History of nephrolithiasis several years ago. 8. Congenital solitary kidney. 9. Stable small 5 mm RLL lung nodule.  No need of screening CT per guidelines.   10. History of diverticulosis    Procedures/ Significant Interventions:    None    Consults:     Consults:     Final Specialist Recommendations/Findings:   IP CONSULT TO INTERNAL MEDICINE  IP CONSULT TO CASE MANAGEMENT  IP CONSULT TO GI  IP CONSULT TO HOME CARE NEEDS      Any Hospital Acquired Infections: none    Discharge Functional Status:  stable    DISCHARGE PLAN     Disposition: home    Patient Instructions:   Discharge Medication List as of 11/10/2020  5:10 PM      START taking these medications    Details   carvedilol (COREG) 6.25 MG tablet Take 1 tablet by mouth 2 times daily (with meals), Disp-60 tablet,R-3Normal         CONTINUE these medications which have NOT CHANGED    Details   glimepiride (AMARYL) 2 MG tablet Take 1 tablet by mouth 2 times daily, Disp-30 tablet, R-3Normal      metFORMIN (GLUCOPHAGE) 500 MG tablet Take 1 tablet by mouth 2 times daily (with meals), Disp-60 tablet, R-3Normal      simvastatin (ZOCOR) 40 MG tablet Take 1 tablet by mouth nightly, Disp-30 tablet, R-3Normal      loperamide (RA ANTI-DIARRHEAL) 2 MG capsule Take 1 capsule by mouth daily, Disp-30 capsule, R-1      Blood Gluc Meter Disp-Strips (BLOOD GLUCOSE METER DISPOSABLE) NELL Disp-1 Device, R-0, NormalBlood glucose meter and test strips DX: DM type 2         STOP taking these medications       warfarin (COUMADIN) 2 MG tablet Comments:   Reason for Stopping:         lisinopril-hydrochlorothiazide (PRINZIDE;ZESTORETIC) 20-12.5 MG per tablet Comments:   Reason for Stopping:         aspirin 81 MG tablet Comments:   Reason for Stopping:               Activity: activity as tolerated    Diet: diabetic diet    Follow-up:    Jesse Antunez MD  [address] [place] [address]  [phone]    Call in 1 day  Call the office to be scheduled for op EGD next week    Damon Moseley MD  [address] [phone]    In 1 week  hospital discharge follow up       Patient Instructions: Hold prinzide at this time and continue with Coreg as the CHA is resolving slowly. Follow up with your PCP before resuming Prinzide. Hold all anticoagulation and aspirin until follow up with GI . Follow up with GI as soon as possible. obtain Blood work within a week before follow up with the PCP. Note that over 30 minutes was spent in preparing discharge papers, discussing discharge with patient, medication review, etc.      Kayleigh Mcknight MD  Internal Medicine Resident, PGY-1  [address], [place]  11/24/2020, 10:30 AM

## 2020-11-25 ENCOUNTER — HOSPITAL ENCOUNTER (OUTPATIENT)
Dept: PREADMISSION TESTING | Age: 80
Setting detail: SPECIMEN
Discharge: HOME | End: 2020-11-29
Payer: MEDICARE

## 2020-11-25 PROCEDURE — U0003 INFECTIOUS AGENT DETECTION BY NUCLEIC ACID (DNA OR RNA); SEVERE ACUTE RESPIRATORY SYNDROME CORONAVIRUS 2 (SARS-COV-2) (CORONAVIRUS DISEASE [COVID-19]), AMPLIFIED PROBE TECHNIQUE, MAKING USE OF HIGH THROUGHPUT TECHNOLOGIES AS DESCRIBED BY CMS-2020-01-R: HCPCS

## 2020-11-27 LAB — SARS-COV-2, NAA: NOT DETECTED

## 2020-11-30 ENCOUNTER — ANESTHESIA (OUTPATIENT)
Dept: ENDOSCOPY | Age: 80
End: 2020-11-30
Payer: MEDICARE

## 2020-11-30 ENCOUNTER — HOSPITAL ENCOUNTER (OUTPATIENT)
Age: 80
Setting detail: OUTPATIENT SURGERY
Discharge: HOME | End: 2020-11-30
Attending: INTERNAL MEDICINE | Admitting: INTERNAL MEDICINE
Payer: MEDICARE

## 2020-11-30 ENCOUNTER — ANESTHESIA EVENT (OUTPATIENT)
Dept: ENDOSCOPY | Age: 80
End: 2020-11-30
Payer: MEDICARE

## 2020-11-30 VITALS — DIASTOLIC BLOOD PRESSURE: 75 MMHG | SYSTOLIC BLOOD PRESSURE: 146 MMHG | OXYGEN SATURATION: 97 %

## 2020-11-30 VITALS
RESPIRATION RATE: 19 BRPM | BODY MASS INDEX: 22.48 KG/M2 | WEIGHT: 157 LBS | OXYGEN SATURATION: 100 % | HEART RATE: 60 BPM | SYSTOLIC BLOOD PRESSURE: 139 MMHG | DIASTOLIC BLOOD PRESSURE: 56 MMHG | HEIGHT: 70 IN | TEMPERATURE: 97.7 F

## 2020-11-30 LAB — GLUCOSE BLD-MCNC: 148 MG/DL (ref 75–110)

## 2020-11-30 PROCEDURE — 3700000001 HC ADD 15 MINUTES (ANESTHESIA): Performed by: INTERNAL MEDICINE

## 2020-11-30 PROCEDURE — 2580000003 HC RX 258: Performed by: ANESTHESIOLOGY

## 2020-11-30 PROCEDURE — 7100000001 HC PACU RECOVERY - ADDTL 15 MIN: Performed by: INTERNAL MEDICINE

## 2020-11-30 PROCEDURE — 88305 TISSUE EXAM BY PATHOLOGIST: CPT

## 2020-11-30 PROCEDURE — 43239 EGD BIOPSY SINGLE/MULTIPLE: CPT | Performed by: INTERNAL MEDICINE

## 2020-11-30 PROCEDURE — 2709999900 HC NON-CHARGEABLE SUPPLY: Performed by: INTERNAL MEDICINE

## 2020-11-30 PROCEDURE — 3700000000 HC ANESTHESIA ATTENDED CARE: Performed by: INTERNAL MEDICINE

## 2020-11-30 PROCEDURE — 82947 ASSAY GLUCOSE BLOOD QUANT: CPT

## 2020-11-30 PROCEDURE — 6360000002 HC RX W HCPCS: Performed by: NURSE ANESTHETIST, CERTIFIED REGISTERED

## 2020-11-30 PROCEDURE — 2500000003 HC RX 250 WO HCPCS: Performed by: NURSE ANESTHETIST, CERTIFIED REGISTERED

## 2020-11-30 PROCEDURE — 3609012400 HC EGD TRANSORAL BIOPSY SINGLE/MULTIPLE: Performed by: INTERNAL MEDICINE

## 2020-11-30 PROCEDURE — 7100000000 HC PACU RECOVERY - FIRST 15 MIN: Performed by: INTERNAL MEDICINE

## 2020-11-30 RX ORDER — HYDRALAZINE HYDROCHLORIDE 20 MG/ML
5 INJECTION INTRAMUSCULAR; INTRAVENOUS EVERY 10 MIN PRN
Status: DISCONTINUED | OUTPATIENT
Start: 2020-11-30 | End: 2020-11-30 | Stop reason: HOSPADM

## 2020-11-30 RX ORDER — SODIUM CHLORIDE 9 MG/ML
INJECTION, SOLUTION INTRAVENOUS CONTINUOUS
Status: DISCONTINUED | OUTPATIENT
Start: 2020-11-30 | End: 2020-11-30 | Stop reason: HOSPADM

## 2020-11-30 RX ORDER — PROPOFOL 10 MG/ML
INJECTION, EMULSION INTRAVENOUS CONTINUOUS PRN
Status: DISCONTINUED | OUTPATIENT
Start: 2020-11-30 | End: 2020-11-30 | Stop reason: SDUPTHER

## 2020-11-30 RX ORDER — PROMETHAZINE HYDROCHLORIDE 25 MG/ML
6.25 INJECTION, SOLUTION INTRAMUSCULAR; INTRAVENOUS
Status: DISCONTINUED | OUTPATIENT
Start: 2020-11-30 | End: 2020-11-30 | Stop reason: HOSPADM

## 2020-11-30 RX ORDER — DIPHENHYDRAMINE HYDROCHLORIDE 50 MG/ML
12.5 INJECTION INTRAMUSCULAR; INTRAVENOUS
Status: DISCONTINUED | OUTPATIENT
Start: 2020-11-30 | End: 2020-11-30 | Stop reason: HOSPADM

## 2020-11-30 RX ORDER — LISINOPRIL 5 MG/1
2.5 TABLET ORAL DAILY
COMMUNITY

## 2020-11-30 RX ORDER — LABETALOL HYDROCHLORIDE 5 MG/ML
5 INJECTION, SOLUTION INTRAVENOUS EVERY 10 MIN PRN
Status: DISCONTINUED | OUTPATIENT
Start: 2020-11-30 | End: 2020-11-30 | Stop reason: HOSPADM

## 2020-11-30 RX ORDER — 0.9 % SODIUM CHLORIDE 0.9 %
500 INTRAVENOUS SOLUTION INTRAVENOUS
Status: DISCONTINUED | OUTPATIENT
Start: 2020-11-30 | End: 2020-11-30 | Stop reason: HOSPADM

## 2020-11-30 RX ORDER — ONDANSETRON 2 MG/ML
4 INJECTION INTRAMUSCULAR; INTRAVENOUS
Status: DISCONTINUED | OUTPATIENT
Start: 2020-11-30 | End: 2020-11-30 | Stop reason: HOSPADM

## 2020-11-30 RX ORDER — LIDOCAINE HYDROCHLORIDE 20 MG/ML
INJECTION, SOLUTION INFILTRATION; PERINEURAL PRN
Status: DISCONTINUED | OUTPATIENT
Start: 2020-11-30 | End: 2020-11-30 | Stop reason: SDUPTHER

## 2020-11-30 RX ADMIN — SODIUM CHLORIDE: 9 INJECTION, SOLUTION INTRAVENOUS at 10:18

## 2020-11-30 RX ADMIN — SODIUM CHLORIDE: 9 INJECTION, SOLUTION INTRAVENOUS at 09:43

## 2020-11-30 RX ADMIN — LIDOCAINE HYDROCHLORIDE 100 MG: 20 INJECTION, SOLUTION INFILTRATION; PERINEURAL at 10:20

## 2020-11-30 RX ADMIN — PROPOFOL 100 MCG/KG/MIN: 10 INJECTION, EMULSION INTRAVENOUS at 10:18

## 2020-11-30 ASSESSMENT — PULMONARY FUNCTION TESTS
PIF_VALUE: 1
PIF_VALUE: 4
PIF_VALUE: 1

## 2020-11-30 ASSESSMENT — PAIN - FUNCTIONAL ASSESSMENT: PAIN_FUNCTIONAL_ASSESSMENT: 0-10

## 2020-11-30 ASSESSMENT — PAIN SCALES - GENERAL: PAINLEVEL_OUTOF10: 0

## 2020-11-30 ASSESSMENT — ENCOUNTER SYMPTOMS: SHORTNESS OF BREATH: 0

## 2020-11-30 NOTE — ANESTHESIA PRE PROCEDURE
Department of Anesthesiology  Preprocedure Note       Name:  John Gomez   Age:  78 y.o.  :  1940                                          MRN:  [id number]         Date:  2020      Surgeon: Gricelda Florez):  Elizabeth Dudley MD    Procedure: Procedure(s):  EGD ESOPHAGOGASTRODUODENOSCOPY    Medications prior to admission:   Prior to Admission medications    Medication Sig Start Date End Date Taking? Authorizing Provider   spironolactone (ALDACTONE) 25 MG tablet TAKE ONE-HALF TABLET BY MOUTH ONCE DAILY (WATER PILL) 8/10/20  Yes Historical Provider, MD   atorvastatin (LIPITOR) 80 MG tablet TAKE ONE TABLET BY MOUTH ONCE DAILY FOR CHOLESTEROL - CALL YOUR PROVIDER IF YOU HAVE UNEXPLAINED MUSCLE PAIN, TENDERNESS OR WEAKNESS.  20  Yes Historical Provider, MD   glipiZIDE (GLUCOTROL) 5 MG tablet TAKE ONE TABLET BY MOUTH TWICE DAILY 30 MINUTES BEFORE BREAKFAST AND DINNER FOR DIABETES 20  Yes Historical Provider, MD   folic acid (FOLVITE) 1 MG tablet TAKE ONE TABLET BY MOUTH ONCE DAILY 20  Yes Historical Provider, MD   vitamin D3 (CHOLECALCIFEROL) 10 MCG (400 UNIT) TABS tablet Take 400 Units by mouth daily   Yes Historical Provider, MD   glimepiride (AMARYL) 2 MG tablet Take 1 tablet by mouth 2 times daily 17  Yes Lizz Oliveira MD   metFORMIN (GLUCOPHAGE) 500 MG tablet Take 1 tablet by mouth 2 times daily (with meals) 17  Yes Lizz Oliveira MD   simvastatin (ZOCOR) 40 MG tablet Take 1 tablet by mouth nightly 17  Yes Lizz Oliveira MD   loperamide (RA ANTI-DIARRHEAL) 2 MG capsule Take 1 capsule by mouth daily 10/26/16  Yes Elizabeth Dudley MD   carvedilol (COREG) 6.25 MG tablet Take 1 tablet by mouth 2 times daily (with meals) 11/10/20   Fina Chawla MD   Blood Gluc Meter Disp-Strips (BLOOD GLUCOSE METER DISPOSABLE) NELL Blood glucose meter and test strips DX: DM type 2 3/15/16   Lizz Oliveira MD       Current medications:    Current Facility-Administered Medications   Medication Dose Route Frequency Provider Last Rate Last Dose    0.9 % sodium chloride infusion   Intravenous Continuous Obdulia Calderon MD           Allergies:  No Known Allergies    Problem List:    Patient Active Problem List   Diagnosis Code    Hypercholesteremia E78.00    Type 2 diabetes mellitus without complication ([facility] F79.5    Hypertension I10    Anemia D64.9    Tubular adenoma of colon D12.6    History of colon polyps Z86.010    Diverticulosis of colon K57.30    CHA (acute kidney injury) ([facility] N17.9    Solitary kidney, congenital Q60.0    S/P coronary artery stent placement Z95.5    GERD (gastroesophageal reflux disease) K21.9    Melena K92.1    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       Past Medical History:        Diagnosis Date    Acute kidney injury ([facility]     Anemia     Diverticulosis of colon     DM (diabetes mellitus) ([facility]     GERD (gastroesophageal reflux disease)     History of colon polyps 10/2016    Hyperlipidemia     Hypertension     Solitary kidney, congenital     Trigger finger     Tubular adenoma of colon 10/05/2016       Past Surgical History:        Procedure Laterality Date    CATARACT REMOVAL WITH IMPLANT Right     CHOLECYSTECTOMY      COLONOSCOPY      COLONOSCOPY  10/05/2016    polyp--tubular adenoma, diverticulosis    CORONARY ANGIOPLASTY WITH STENT PLACEMENT      EYE SURGERY      HERNIA REPAIR Left     inguinal       Social History:    Social History     Tobacco Use    Smoking status: Former Smoker     Last attempt to quit: 3/15/1999     Years since quittin.7    Smokeless tobacco: Never Used   Substance Use Topics    Alcohol use: No     Alcohol/week: 0.0 standard drinks                                Counseling given: Not Answered      Vital Signs (Current):   Vitals:    20 0915   BP: (!) 160/63   Pulse: 60   Resp: 16   Temp: 97.5 °F (36.4 °C)   TempSrc: Infrared   SpO2: 100% Weight: 157 lb (71.2 kg)   Height: 5' 10\" (1.778 m)                                              BP Readings from Last 3 Encounters:   11/30/20 (!) 160/63   11/16/20 131/62   11/10/20 (!) 130/59       NPO Status: Time of last liquid consumption: 1800                        Time of last solid consumption: 1800                        Date of last liquid consumption: 11/29/20                        Date of last solid food consumption: 11/29/20    BMI:   Wt Readings from Last 3 Encounters:   11/30/20 157 lb (71.2 kg)   11/16/20 157 lb 8 oz (71.4 kg)   11/09/20 170 lb (77.1 kg)     Body mass index is 22.53 kg/m². CBC:   Lab Results   Component Value Date    WBC 7.0 11/10/2020    RBC 3.10 11/10/2020    HGB 9.1 11/10/2020    HCT 29.4 11/10/2020    MCV 94.8 11/10/2020    RDW 14.6 11/10/2020     11/10/2020       CMP:   Lab Results   Component Value Date     11/10/2020    K 4.3 11/10/2020     11/10/2020    CO2 17 11/10/2020    BUN 39 11/10/2020    CREATININE 1.67 11/10/2020    GFRAA 48 11/10/2020    LABGLOM 40 11/10/2020    GLUCOSE 109 11/10/2020    PROT 7.0 11/09/2020    CALCIUM 8.5 11/10/2020    BILITOT 0.87 11/09/2020    ALKPHOS 94 11/09/2020    AST 19 11/09/2020    ALT 13 11/09/2020       POC Tests: No results for input(s): POCGLU, POCNA, POCK, POCCL, POCBUN, POCHEMO, POCHCT in the last 72 hours.     Coags:   Lab Results   Component Value Date    PROTIME 11.1 11/09/2020    INR 1.1 11/09/2020       HCG (If Applicable): No results found for: PREGTESTUR, PREGSERUM, HCG, HCGQUANT     ABGs: No results found for: PHART, PO2ART, ISW0GOC, YVS8SRI, BEART, P3JQAKKV     Type & Screen (If Applicable):  No results found for: LABABO, LABRH    Drug/Infectious Status (If Applicable):  No results found for: HIV, HEPCAB    COVID-19 Screening (If Applicable):   Lab Results   Component Value Date    COVID19 Not Detected 11/25/2020         Anesthesia Evaluation  Patient summary reviewed and Nursing notes reviewed no history of anesthetic complications:   Airway: Mallampati: II  TM distance: >3 FB   Neck ROM: full  Mouth opening: > = 3 FB Dental:    (+) upper dentures      Pulmonary:Negative Pulmonary ROS and normal exam  breath sounds clear to auscultation      (-) shortness of breath                           Cardiovascular:    (+) hypertension:, CAD: no interval change, CABG/stent (2 years ago): no interval change, hyperlipidemia    (-)  angina      Rhythm: regular  Rate: normal                 ROS comment: Normal sinus rhythm  Incomplete RBBB  Inferior infarct , age undetermined  Abnormal ECG  No previous ECGs available     Neuro/Psych:   Negative Neuro/Psych ROS              GI/Hepatic/Renal:   (+) GERD:,           Endo/Other:    (+) DiabetesType II DM, , blood dyscrasia: anemia:., .                 Abdominal:           Vascular: negative vascular ROS. Anesthesia Plan      MAC     ASA 3       Induction: intravenous. MIPS: Prophylactic antiemetics administered. Anesthetic plan and risks discussed with patient. Plan discussed with CRNA.                   Santo Rosado MD   11/30/2020

## 2020-11-30 NOTE — H&P
HISTORY and [id number]       NAME:  Marco Antonio Finch  MRN: [id number]   YOB: 1940   Date: 11/30/2020   Age: 78 y.o. Gender: male       COMPLAINT AND PRESENT HISTORY:              Marco Antonio Finch is 78 y.o.,  male, undergoing preadmission testing for EGD. History of cholecystectomy and hernia repair surgery. Pt was recently hospitalized for epigastric abdominal pain and black tarry stools and CHA. Pt reports the symptoms began a few weeks ago and lasted a few days but has since resolved. Pt was on aspirin prior to hospitalization but has been discontinued. Pt reports he has not taken any anticoagulants since. Pt denies current abdominal pain. Started on pantoprazole recently with good relief of symptoms. Reports occasional constipation. Denies taking any medications for relief of constipation. Denies current nausea, vomiting, diarrhea, hematochezia or melena. NPO. No medications taken this am. Denies chest pain/pressure, palpitations, SOB, recent URI, fever or chills.      PAST MEDICAL HISTORY     Past Medical History:   Diagnosis Date    Diverticulosis of colon     DM [facility]diabetes mellitus) ([facility] [address]     History of colon polyps 10/2016    Hyperlipidemia     Hypertension     Trigger finger     Tubular adenoma of colon 10/05/2016       SURGICAL HISTORY       Past Surgical History:   Procedure Laterality Date    CHOLECYSTECTOMY      COLONOSCOPY      COLONOSCOPY  10/05/2016    polyp--tubular adenoma, diverticulosis    HERNIA REPAIR Left     inguinal       FAMILY HISTORY       Family History   Problem Relation Age of Onset    Cancer Mother         unknown site    Heart Disease Father     Cancer Brother         unknown site       SOCIAL HISTORY       Social History     Socioeconomic History    Marital status: Single     Spouse name: Not on file    Number of children: Not on file    Years of education: Not on file    Highest education level: Not on file   Occupational History    Not on file   Social Needs    Financial resource strain: Not on file    Food insecurity     Worry: Not on file     Inability: Not on file    Transportation needs     Medical: Not on file     Non-medical: Not on file   Tobacco Use    Smoking status: Former Smoker     Last attempt to quit: 3/15/1999     Years since quittin.7    Smokeless tobacco: Never Used   Substance and Sexual Activity    Alcohol use: No     Alcohol/week: 0.0 standard drinks    Drug use: No    Sexual activity: Not on file   Lifestyle    Physical activity     Days per week: Not on file     Minutes per session: Not on file    Stress: Not on file   Relationships    Social connections     Talks on phone: Not on file     Gets together: Not on file     Attends [employer] service: Not on file     Active member of club or organization: Not on file     Attends meetings of clubs or organizations: Not on file     Relationship status: Not on file    Intimate partner violence     Fear of current or ex partner: Not on file     Emotionally abused: Not on file     Physically abused: Not on file     Forced sexual activity: Not on file   Other Topics Concern    Not on file   Social History Narrative    Not on file        REVIEW OF SYSTEMS      No Known Allergies    No current facility-administered medications on file prior to encounter. Current Outpatient Medications on File Prior to Encounter   Medication Sig Dispense Refill    spironolactone (ALDACTONE) 25 MG tablet TAKE ONE-HALF TABLET BY MOUTH ONCE DAILY (WATER PILL)      atorvastatin (LIPITOR) 80 MG tablet TAKE ONE TABLET BY MOUTH ONCE DAILY FOR CHOLESTEROL - CALL YOUR PROVIDER IF YOU HAVE UNEXPLAINED MUSCLE PAIN, TENDERNESS OR WEAKNESS.       glipiZIDE (GLUCOTROL) 5 MG tablet TAKE ONE TABLET BY MOUTH TWICE DAILY 30 MINUTES BEFORE BREAKFAST AND DINNER FOR DIABETES      folic acid (FOLVITE) 1 MG tablet TAKE ONE TABLET BY MOUTH ONCE DAILY      vitamin D3 (CHOLECALCIFEROL) 10 MCG (400 UNIT) TABS tablet Take 400 Units by mouth daily      carvedilol (COREG) 6.25 MG tablet Take 1 tablet by mouth 2 times daily (with meals) 60 tablet 3    glimepiride (AMARYL) 2 MG tablet Take 1 tablet by mouth 2 times daily (Patient not taking: Reported on 11/16/2020) 30 tablet 3    metFORMIN (GLUCOPHAGE) 500 MG tablet Take 1 tablet by mouth 2 times daily (with meals) (Patient not taking: Reported on 11/16/2020) 60 tablet 3    simvastatin (ZOCOR) 40 MG tablet Take 1 tablet by mouth nightly (Patient not taking: Reported on 11/16/2020) 30 tablet 3    loperamide (RA ANTI-DIARRHEAL) 2 MG capsule Take 1 capsule by mouth daily (Patient not taking: Reported on 11/16/2020) 30 capsule 1    Blood Gluc Meter Disp-Strips (BLOOD GLUCOSE METER DISPOSABLE) NELL Blood glucose meter and test strips DX: DM type 2 1 Device 0       Negative except for what is mentioned in the HPI. GENERAL PHYSICAL EXAM     Vitals: Review vitals per RN flowsheet. GENERAL APPEARANCE:   Phyllis Liu is 78 y.o.,  male, not obese, nourished, conscious, alert. Does not appear to be in distress or pain at this time. SKIN:  Warm, dry, no cyanosis or jaundice. HEAD:  Normocephalic, atraumatic. EYES:  Pupils equal, reactive to light. EARS:  No discharge, no marked hearing loss. NOSE:  No rhinorrhea, epistaxis or septal deformity. THROAT:  Not congested. No ulceration bleeding or discharge. NECK:  No stiffness, trachea central.  No palpable masses or L.N.                 CHEST:  Symmetrical and equal on expansion. HEART:  RRR S1 > S2. No audible murmurs or gallops. LUNGS:  Equal on expansion, normal breath sounds. No wheezing, rhonchi or rales. ABDOMEN:  Soft on palpation. No localized tenderness. No guarding or rigidity.                LYMPHATICS:  No palpable cervical lymphadenopathy. LOCOMOTOR, BACK AND SPINE:  No tenderness or deformities. EXTREMITIES:  Symmetrical, no pretibial edema. No calf tenderness. No discoloration or ulcerations. NEUROLOGIC:  The patient is conscious, alert, oriented. No facial drooping. Speech clear. No apparent focal sensory or motor deficits.              PROVISIONAL DIAGNOSES / SURGERY:      EPIGASTRIC ABDOMINAL PAIN MELENA     EGD ESOPHAGOGASTRODUODENOSCOPY    Patient Active Problem List    Diagnosis Date Noted    GERD (gastroesophageal reflux disease) 11/10/2020    Melena     Iron deficiency anemia secondary to blood loss (chronic)     CHA (acute kidney injury) ([facility] 11/09/2020    Solitary kidney, congenital 11/09/2020    S/P coronary artery stent placement 11/09/2020    Diverticulosis of colon     Tubular adenoma of colon 10/05/2016    History of colon polyps 10/01/2016    Anemia 09/14/2016    Hypertension     Hypercholesteremia 03/15/2016    Type 2 diabetes mellitus without complication ([facility] [id number]           BRONSON Braga - CNP on 11/30/2020 at 8:21 AM

## 2020-11-30 NOTE — OP NOTE
ESOPHAGOGASTRODUODENOSCOPY   ( EGD )  DATE OF PROCEDURE: 11/30/2020     SURGEON: Zayra Rey MD    ASSISTANT: None    PREOPERATIVE DIAGNOSIS: History of melena, anemia. Procedure performed to evaluate upper GI lesions    POSTOPERATIVE DIAGNOSIS: Duodenal ulcer    OPERATION: Upper GI endoscopy with Biopsy    ANESTHESIA: MAC    ESTIMATED BLOOD LOSS: None    COMPLICATIONS: None. SPECIMENS:  Was Obtained: Biopsies obtained randomly from the body and antrum to evaluate H. pylori    HISTORY: The patient is a 78y.o. year old male with history of above preop diagnosis. I recommended esophagogastroduodenoscopy with possible biopsy and I explained the risk, benefits, expected outcome, and alternatives to the procedure. Risks included but are not limited to bleeding, infection, respiratory distress, hypotension, and perforation of the esophagus, stomach, or duodenum. Patient understands and is in agreement. PROCEDURE: The patient was given IV conscious sedation. The patient's SPO2 remained above 90% throughout the procedure. Cetacaine spray given. Patient placed in left lateral position. Olympus  videogastroscope was inserted orally under vision into the esophagus without difficulty and advanced into the stomach then through the pylorus up to the second part of duodenum. Findings:    Retropharyngeal area was grossly normal appearing    Esophagus: normal    Stomach:    Fundus and Cardia Examined in Retroflexed View: normal    Body: normal    Antrum: normal  Multiple random biopsies taken from the body and antrum to evaluate H. pylori  Duodenum:     Descending: normal    Bulb: abnormal: Linear ulcer in the duodenal bulb about 1 cm long. Looks benign. While withdrawing the scope the above findings were verified and the scope was removed. The patient has tolerated the procedure without unusual events. Recommendations/Plan:   1. F/U Biopsies  2.  F/U In Office as instructed  3.  Discussed with the family  To continue PPI therapy    To see in the office in the next 3 weeks    May need to recheck his stool for occult blood in the office                 Electronically signed by Tyson Groves MD  on 11/30/2020 at 10:28 AM

## 2020-12-01 ENCOUNTER — CARE COORDINATION (OUTPATIENT)
Dept: CASE MANAGEMENT | Age: 80
End: 2020-12-01

## 2020-12-01 LAB — SURGICAL PATHOLOGY REPORT: NORMAL

## 2020-12-04 ENCOUNTER — TELEPHONE (OUTPATIENT)
Dept: GASTROENTEROLOGY | Age: 80
End: 2020-12-04

## 2020-12-04 NOTE — TELEPHONE ENCOUNTER
Patient's Path report came back and he is positive for H Pylori. Message routed to [facility] for recommendations.

## 2020-12-08 ENCOUNTER — CARE COORDINATION (OUTPATIENT)
Dept: CASE MANAGEMENT | Age: 80
End: 2020-12-08

## 2020-12-08 NOTE — CARE COORDINATION
[facility] Transitions Bundled Payments for Care Improvement (BPCI) Follow Up Call  Qualifying Diagnosis of Qualifying Diagnosis Renal Failure   12/8/2020  Patient Name:  Fabiola Holden   YOB: 1940  Discharge Date:  11/30/20  RARS:  Readmission Risk Score: 14    PCP:  Ana Kenney MD  Attempted to contact patient, message left with person who answered the phone.   Care Transitions will continue to follow per [facility] Program.  Lainey Garozn RN, CTN    Follow Up Concerns:  Future Appointments   Date Time Provider [place]   12/16/2020 10:30 AM Renetta Lucas APRN - NP [facility]

## 2020-12-09 RX ORDER — AMOXICILLIN 500 MG/1
1000 TABLET, FILM COATED ORAL 2 TIMES DAILY
Qty: 40 TABLET | Refills: 0 | Status: SHIPPED | OUTPATIENT
Start: 2020-12-09 | End: 2020-12-10

## 2020-12-09 RX ORDER — OMEPRAZOLE 20 MG/1
20 CAPSULE, DELAYED RELEASE ORAL 2 TIMES DAILY
Qty: 20 CAPSULE | Refills: 0 | Status: SHIPPED | OUTPATIENT
Start: 2020-12-09 | End: 2020-12-10

## 2020-12-09 NOTE — TELEPHONE ENCOUNTER
Writer called patient and called Prerna Thomas and told him about patient's results. Billie Pruett stated that the medication needs to be sent to [address] or have the PCP order it. Therefore, the writer will call the PCP office and see if they can order the medication or does the writer have to call [place]. Writer will report will would need to be done.

## 2020-12-10 ENCOUNTER — TELEPHONE (OUTPATIENT)
Dept: GASTROENTEROLOGY | Age: 80
End: 2020-12-10

## 2020-12-10 RX ORDER — AMOXICILLIN 500 MG/1
1000 TABLET, FILM COATED ORAL 2 TIMES DAILY
Qty: 40 TABLET | Refills: 0 | Status: SHIPPED | OUTPATIENT
Start: 2020-12-10 | End: 2020-12-20

## 2020-12-10 RX ORDER — OMEPRAZOLE 20 MG/1
20 CAPSULE, DELAYED RELEASE ORAL 2 TIMES DAILY
Qty: 20 CAPSULE | Refills: 0 | Status: SHIPPED | OUTPATIENT
Start: 2020-12-10 | End: 2020-12-20

## 2020-12-10 NOTE — TELEPHONE ENCOUNTER
Talha Henderson from [facility] called requesting notes from patient visit. Writer faxed updated records to [phone], as this is the referring provider.

## 2020-12-10 NOTE — TELEPHONE ENCOUNTER
Pravinr called the patient's PCP on 12/10/2020 and told them that the patient was positive for H Pylori and the medication needed to treat the infection was sent to [facility] [address] [facility]. Writer also stated that the patient was contacted with the results and found out that the script needs to go to the [place]. Pravinr was told to send the medication e-script to the [place]. The information for the [place] clinic is in the patient's chart. Writer will route to [place] to reorder medication and send it to the right pharmacy.

## 2020-12-30 ENCOUNTER — CARE COORDINATION (OUTPATIENT)
Dept: CASE MANAGEMENT | Age: 80
End: 2020-12-30

## 2020-12-30 NOTE — CARE COORDINATION
[facility] Transitions Follow Up Call    2020    Patient: Leandro Plama  Patient : 1940   MRN: [id number]  Reason for Admission:   Discharge Date: 20 RARS: Readmission Risk Score: 14       Attempted to reach patient by phone for follow up; BPCI-A. Party answering the phone states patient is unavailable at this time. Will attempt to contact patient at a later time.      Marcello Alcala RN

## 2021-01-20 ENCOUNTER — CARE COORDINATION (OUTPATIENT)
Dept: CASE MANAGEMENT | Age: 81
End: 2021-01-20

## 2021-01-20 NOTE — CARE COORDINATION
[facility] Transitions Follow Up Call    2021    Patient: Sanjay Jin  Patient : 1940   MRN: [id number]  Reason for Admission:   Discharge Date: 20 RARS: Readmission Risk Score: 14    Follow Up: Attempted to contact patient for BPCI-A follow up. Unable to reach patient. Left message with contact information and request for call back. No future appointments.     Johnathan Hays RN

## 2021-02-08 ENCOUNTER — CARE COORDINATION (OUTPATIENT)
Dept: CASE MANAGEMENT | Age: 81
End: 2021-02-08

## 2021-02-08 NOTE — CARE COORDINATION
[facility] Transitions Follow Up Call    2021    Patient: Tabatha De La Torre  Patient : 1940   MRN: [id number]  Reason for Admission:   Discharge Date: 20 RARS: Readmission Risk Score: 14    Follow Up: Attempted to contact patient for final BPCI-A follow up. Unable to reach patient. Left message with contact information and request for call back. BPCI-A bundle ending today. CTN signing off. No future appointments.     Dilan Hernandez RN

## 2024-02-29 NOTE — ED NOTES
Pt transported to [address] [place] [address] Anna, RN  11/09/20 1600 Attending Attestation (For Attendings USE Only)...

## (undated) DEVICE — GLOVE ORANGE PI 7   MSG9070

## (undated) DEVICE — BITEBLOCK 54FR W/ DENT RIM BLOX

## (undated) DEVICE — DEFENDO AIR WATER SUCTION AND BIOPSY VALVE KIT FOR  OLYMPUS: Brand: DEFENDO AIR/WATER/SUCTION AND BIOPSY VALVE

## (undated) DEVICE — ENDO KIT W/SYRINGE: Brand: MEDLINE INDUSTRIES, INC.

## (undated) DEVICE — SINGLE-USE BIOPSY FORCEPS: Brand: RADIAL JAW 4